# Patient Record
Sex: FEMALE | Race: BLACK OR AFRICAN AMERICAN | Employment: UNEMPLOYED | URBAN - METROPOLITAN AREA
[De-identification: names, ages, dates, MRNs, and addresses within clinical notes are randomized per-mention and may not be internally consistent; named-entity substitution may affect disease eponyms.]

---

## 2019-03-07 RX ORDER — CALCITRIOL 0.5 UG/1
0.5 CAPSULE ORAL DAILY
COMMUNITY

## 2019-03-07 RX ORDER — CINACALCET 30 MG/1
30 TABLET, FILM COATED ORAL DAILY
COMMUNITY

## 2019-03-07 RX ORDER — SEVELAMER CARBONATE 800 MG/1
800 TABLET, FILM COATED ORAL
COMMUNITY

## 2019-03-07 RX ORDER — ATORVASTATIN CALCIUM 40 MG/1
40 TABLET, FILM COATED ORAL DAILY
COMMUNITY

## 2019-03-07 NOTE — PROGRESS NOTES
PT aware of NPO status. PT aware of need to hold anticoagulants per protocol. PT aware of potential for sedation administration and need for  at discharge. PT aware of arrival time pre procedure. Will arrive at 1300 per Dr Ernst Moreno office. Will call back to review medications as patient is not at home. Pt states no questions at this time.

## 2019-03-08 ENCOUNTER — HOSPITAL ENCOUNTER (OUTPATIENT)
Dept: INTERVENTIONAL RADIOLOGY/VASCULAR | Age: 49
Discharge: HOME | End: 2019-03-08
Attending: SURGERY | Admitting: SURGERY
Payer: MEDICARE

## 2019-03-08 VITALS
SYSTOLIC BLOOD PRESSURE: 114 MMHG | TEMPERATURE: 97.6 F | HEIGHT: 69 IN | DIASTOLIC BLOOD PRESSURE: 61 MMHG | OXYGEN SATURATION: 96 % | BODY MASS INDEX: 38.71 KG/M2 | WEIGHT: 261.38 LBS | RESPIRATION RATE: 13 BRPM | HEART RATE: 80 BPM

## 2019-03-08 DIAGNOSIS — N18.6 ESRD (END STAGE RENAL DISEASE) (HCC): ICD-10-CM

## 2019-03-08 LAB
ANION GAP SERPL CALC-SCNC: 10 MMOL/L (ref 3–18)
APTT PPP: 33.7 SEC (ref 23–36.4)
BASOPHILS # BLD: 0 K/UL (ref 0–0.1)
BASOPHILS NFR BLD: 0 % (ref 0–2)
BUN SERPL-MCNC: 78 MG/DL (ref 7–18)
BUN/CREAT SERPL: 7 (ref 12–20)
CALCIUM SERPL-MCNC: 8.8 MG/DL (ref 8.5–10.1)
CHLORIDE SERPL-SCNC: 102 MMOL/L (ref 100–108)
CO2 SERPL-SCNC: 28 MMOL/L (ref 21–32)
CREAT SERPL-MCNC: 11.9 MG/DL (ref 0.6–1.3)
DIFFERENTIAL METHOD BLD: ABNORMAL
EOSINOPHIL # BLD: 0.2 K/UL (ref 0–0.4)
EOSINOPHIL NFR BLD: 3 % (ref 0–5)
ERYTHROCYTE [DISTWIDTH] IN BLOOD BY AUTOMATED COUNT: 16.6 % (ref 11.6–14.5)
GLUCOSE SERPL-MCNC: 85 MG/DL (ref 74–99)
HCG SERPL QL: NEGATIVE
HCT VFR BLD AUTO: 31 % (ref 35–45)
HGB BLD-MCNC: 9.2 G/DL (ref 12–16)
INR PPP: 1 (ref 0.8–1.2)
LYMPHOCYTES # BLD: 1.7 K/UL (ref 0.9–3.6)
LYMPHOCYTES NFR BLD: 26 % (ref 21–52)
MCH RBC QN AUTO: 28.5 PG (ref 24–34)
MCHC RBC AUTO-ENTMCNC: 29.7 G/DL (ref 31–37)
MCV RBC AUTO: 96 FL (ref 74–97)
MONOCYTES # BLD: 0.5 K/UL (ref 0.05–1.2)
MONOCYTES NFR BLD: 7 % (ref 3–10)
NEUTS SEG # BLD: 4.1 K/UL (ref 1.8–8)
NEUTS SEG NFR BLD: 64 % (ref 40–73)
PLATELET # BLD AUTO: 163 K/UL (ref 135–420)
PMV BLD AUTO: 9.4 FL (ref 9.2–11.8)
POTASSIUM SERPL-SCNC: 4.4 MMOL/L (ref 3.5–5.5)
PROTHROMBIN TIME: 13.2 SEC (ref 11.5–15.2)
RBC # BLD AUTO: 3.23 M/UL (ref 4.2–5.3)
SODIUM SERPL-SCNC: 140 MMOL/L (ref 136–145)
WBC # BLD AUTO: 6.5 K/UL (ref 4.6–13.2)

## 2019-03-08 PROCEDURE — 74011250636 HC RX REV CODE- 250/636

## 2019-03-08 PROCEDURE — 36902 INTRO CATH DIALYSIS CIRCUIT: CPT

## 2019-03-08 PROCEDURE — 85610 PROTHROMBIN TIME: CPT

## 2019-03-08 PROCEDURE — 85730 THROMBOPLASTIN TIME PARTIAL: CPT

## 2019-03-08 PROCEDURE — C1725 CATH, TRANSLUMIN NON-LASER: HCPCS

## 2019-03-08 PROCEDURE — 36901 INTRO CATH DIALYSIS CIRCUIT: CPT

## 2019-03-08 PROCEDURE — 85025 COMPLETE CBC W/AUTO DIFF WBC: CPT

## 2019-03-08 PROCEDURE — 74011636320 HC RX REV CODE- 636/320: Performed by: SURGERY

## 2019-03-08 PROCEDURE — 80048 BASIC METABOLIC PNL TOTAL CA: CPT

## 2019-03-08 PROCEDURE — 74011250636 HC RX REV CODE- 250/636: Performed by: SURGERY

## 2019-03-08 PROCEDURE — 84703 CHORIONIC GONADOTROPIN ASSAY: CPT

## 2019-03-08 PROCEDURE — 99152 MOD SED SAME PHYS/QHP 5/>YRS: CPT

## 2019-03-08 RX ORDER — FLUMAZENIL 0.1 MG/ML
INJECTION INTRAVENOUS
Status: DISCONTINUED
Start: 2019-03-08 | End: 2019-03-08 | Stop reason: WASHOUT

## 2019-03-08 RX ORDER — HEPARIN SODIUM 200 [USP'U]/100ML
INJECTION, SOLUTION INTRAVENOUS
Status: DISCONTINUED
Start: 2019-03-08 | End: 2019-03-08 | Stop reason: HOSPADM

## 2019-03-08 RX ORDER — SODIUM CHLORIDE 9 MG/ML
25 INJECTION, SOLUTION INTRAVENOUS CONTINUOUS
Status: DISCONTINUED | OUTPATIENT
Start: 2019-03-08 | End: 2019-03-08 | Stop reason: HOSPADM

## 2019-03-08 RX ORDER — LIDOCAINE HYDROCHLORIDE 10 MG/ML
INJECTION, SOLUTION EPIDURAL; INFILTRATION; INTRACAUDAL; PERINEURAL
Status: COMPLETED
Start: 2019-03-08 | End: 2019-03-08

## 2019-03-08 RX ORDER — FLUMAZENIL 0.1 MG/ML
0.2 INJECTION INTRAVENOUS
Status: DISCONTINUED | OUTPATIENT
Start: 2019-03-08 | End: 2019-03-08 | Stop reason: HOSPADM

## 2019-03-08 RX ORDER — LIDOCAINE HYDROCHLORIDE 10 MG/ML
1-10 INJECTION, SOLUTION EPIDURAL; INFILTRATION; INTRACAUDAL; PERINEURAL
Status: COMPLETED | OUTPATIENT
Start: 2019-03-08 | End: 2019-03-08

## 2019-03-08 RX ORDER — NALOXONE HYDROCHLORIDE 0.4 MG/ML
0.1 INJECTION, SOLUTION INTRAMUSCULAR; INTRAVENOUS; SUBCUTANEOUS AS NEEDED
Status: DISCONTINUED | OUTPATIENT
Start: 2019-03-08 | End: 2019-03-08 | Stop reason: HOSPADM

## 2019-03-08 RX ORDER — CLINDAMYCIN PHOSPHATE 900 MG/50ML
900 INJECTION, SOLUTION INTRAVENOUS ONCE
Status: COMPLETED | OUTPATIENT
Start: 2019-03-08 | End: 2019-03-08

## 2019-03-08 RX ORDER — MIDAZOLAM HYDROCHLORIDE 1 MG/ML
.5-4 INJECTION, SOLUTION INTRAMUSCULAR; INTRAVENOUS
Status: DISCONTINUED | OUTPATIENT
Start: 2019-03-08 | End: 2019-03-08 | Stop reason: HOSPADM

## 2019-03-08 RX ORDER — FENTANYL CITRATE 50 UG/ML
INJECTION, SOLUTION INTRAMUSCULAR; INTRAVENOUS
Status: COMPLETED
Start: 2019-03-08 | End: 2019-03-08

## 2019-03-08 RX ORDER — HEPARIN SODIUM 1000 [USP'U]/ML
INJECTION, SOLUTION INTRAVENOUS; SUBCUTANEOUS
Status: DISCONTINUED
Start: 2019-03-08 | End: 2019-03-08 | Stop reason: WASHOUT

## 2019-03-08 RX ORDER — HEPARIN SODIUM 200 [USP'U]/100ML
500 INJECTION, SOLUTION INTRAVENOUS
Status: DISCONTINUED | OUTPATIENT
Start: 2019-03-08 | End: 2019-03-08 | Stop reason: HOSPADM

## 2019-03-08 RX ORDER — MIDAZOLAM HYDROCHLORIDE 1 MG/ML
INJECTION, SOLUTION INTRAMUSCULAR; INTRAVENOUS
Status: COMPLETED
Start: 2019-03-08 | End: 2019-03-08

## 2019-03-08 RX ORDER — HEPARIN SODIUM 1000 [USP'U]/ML
10000 INJECTION, SOLUTION INTRAVENOUS; SUBCUTANEOUS
Status: DISCONTINUED | OUTPATIENT
Start: 2019-03-08 | End: 2019-03-08 | Stop reason: HOSPADM

## 2019-03-08 RX ORDER — FENTANYL CITRATE 50 UG/ML
25-200 INJECTION, SOLUTION INTRAMUSCULAR; INTRAVENOUS
Status: DISCONTINUED | OUTPATIENT
Start: 2019-03-08 | End: 2019-03-08 | Stop reason: HOSPADM

## 2019-03-08 RX ORDER — NALOXONE HYDROCHLORIDE 0.4 MG/ML
INJECTION, SOLUTION INTRAMUSCULAR; INTRAVENOUS; SUBCUTANEOUS
Status: DISCONTINUED
Start: 2019-03-08 | End: 2019-03-08 | Stop reason: WASHOUT

## 2019-03-08 RX ADMIN — MIDAZOLAM HYDROCHLORIDE 1 MG: 1 INJECTION, SOLUTION INTRAMUSCULAR; INTRAVENOUS at 17:14

## 2019-03-08 RX ADMIN — LIDOCAINE HYDROCHLORIDE 1 ML: 10 INJECTION, SOLUTION EPIDURAL; INFILTRATION; INTRACAUDAL; PERINEURAL at 17:24

## 2019-03-08 RX ADMIN — IOPAMIDOL 30 ML: 612 INJECTION, SOLUTION INTRAVENOUS at 17:24

## 2019-03-08 RX ADMIN — CLINDAMYCIN PHOSPHATE 900 MG: 900 INJECTION, SOLUTION INTRAVENOUS at 17:09

## 2019-03-08 RX ADMIN — SODIUM CHLORIDE 25 ML/HR: 900 INJECTION, SOLUTION INTRAVENOUS at 13:54

## 2019-03-08 RX ADMIN — FENTANYL CITRATE 50 MCG: 50 INJECTION, SOLUTION INTRAMUSCULAR; INTRAVENOUS at 17:14

## 2019-03-08 RX ADMIN — FENTANYL CITRATE 50 MCG: 50 INJECTION, SOLUTION INTRAMUSCULAR; INTRAVENOUS at 17:16

## 2019-03-08 NOTE — PROCEDURES
[facility] SPECIAL[facility]  PROCEDURE NOTE  [facility]        Date: 3/8/2019, 5:40 PM      Pre-Op Dx : Inability to cannulate right arm AVG with pulling of clots    Post-Op Dx: same    Procedure : Right arm Fistulagram with retrograde brachial arteriogram, central venogram and balloon angioplasty of AVG    Surgeon : Xenia Young    Assistant: None    Anesthesia : Moderate Sedation and lidocaine    Findings : Completely normal Fistualgram . Mild stenosis at junction between new AVG and native vein from previous Fistula. Skin marked for dialysis center    Comp: none    EBL : Minimal    Contrast : 40 ml. Visipaque    Access : Right AVG    Implants : NO     Specimens: None    Blood Product Administered : NO     Sheath Size : 6 Romanian    Heparin : NO 0 thousand units    Closure Device : NO NA    US Guidance : NO     Special Devices : none    Sedation :    Moderate sedation was administered. The patient was constantly monitored by Dials from   until 1725  hours. Fentanyl 100 mcg, Versed 1 mg. INDICATION FOR PROCEDURE:    60-year-old -American female who recently had a revision of her right upper extremity AV access with an interposition AV graft. She also had an infected portion of AV graft that was excised. She comes in today because she says that they are pulling clots at dialysis. The graft has been successfully angioplastied. On February 26, 2019 I performed a completely normal fistulogram on her. I told her I would do another fistulogram to ascertain whether or not there were any abnormalities. Risks, benefits and potential complications of the procedure were explained to the patient prior to proceeding and she signed a consent allowing me to proceed    TECHNICAL DETAILS OF PROCEDURE:    The patient was correctly identified brought to the interventional suite and placed in the supine position. Her right arm was prepped and draped in a sterile fashion.   A timeout was taken. Moderate sedation was administered. 6 [place]dean sheath was inserted into the AV graft using Seldinger technique after numbing the skin with 1% lidocaine. Fistulogram was performed. Central venogram was performed. No abnormalities were encountered. I passed a wire centrally. There was a very mild area of stenosis between the AV graft and native vein anastomosis. I inflated an 8 mm balloon here and did a retrograde brachial arteriogram.  There was no inflow problems. I deflated the balloon and removed it. Completion fistulogram showed no residual stenosis whatsoever. Brisk was flow. I removed the wire and sheath and closed the puncture site with a 4-0 Monocryl pursestring suture. Hemostasis was good. Dermabond and a sterile dressing were applied. The curtains were taken down and the skin was marked for the dialysis access center to know where to access. I was present and scrubbed throughout.         [address] [place] [address] Vascular Specialists  [phone]  Pager [phone]

## 2019-03-08 NOTE — PROGRESS NOTES
Back from procedure. Right upper arm fistula dressing intact. pt denies pain. 1745 Diet given. [address] Discharge instruction given, verbalized understanding. Dressing intact to fistula. Pt denies pain.

## 2019-03-08 NOTE — H&P
Patient History and Evaluation      3-8-2019 1641 hours    Lina Sher is a 53 y.o. female              PCP:None No PCP PCP, MD  HPI: S/P recent revision of the right arm AVG  Apparently have been pulling clots at Hemodialysis  Here for Fistulagram possible intervention. Can not seem to be able to access it according to her  I can feel a thrill but it is also somewhat pulsatile       Allergies   Allergen Reactions    Flagyl [Metronidazole] hives    Percocet [Oxycodone-Acetaminophen] anaphylaxis/angioedema    Penicillins hives      Current Medications:         Outpatient Medications Marked as Taking for the 2/26/19 encounter [facility] Encounter) with Emeli Anna, DO   Medication Sig Dispense Refill    atorvastatin (LIPITOR) 40 mg PO TABS Take 40 mg by Mouth Every Night at Bedtime.        b complex-vitamin c-folic acid (NEPHRO/TRIPHROCAPS) 1 mg PO CAPS Take 1 Cap by Mouth Once a Day.        calcitRIOL (ROCALTROL) 0.25 mcg PO CAPS Take 0.25 mcg by Mouth Once a Day. Take 1 by mouth daily Monday-Friday; 2 by mouth on Saturday and Sunday. (9 total per week)        Cholecalciferol, Vitamin D3, 50,000 unit PO CAPS Take  by Mouth Every 7 Days.        cinacalcet (SENSIPAR) 30 mg PO TABS Take 60 mg by Mouth Once a Day. Take with largest meal        cyclobenzaprine (FLEXERIL) 10 mg PO TABS Take 10 mg by Mouth Every 8 Hours As Needed.        epoetin jaskaran, ESRD, (EPOGEN) 10,000 unit/mL Inj SOLN Inject 10,000 Units beneath the skin Every Monday, Wednesday & Friday.        gabapentin (NEURONTIN) 300 mg PO CAPS Take 300 mg by Mouth 2 Times Daily As Needed.        HYDROmorphone (HYDROMORPHONE) 2 mg PO TABS Take 1 Tab by Mouth Every 4 Hours for 5 days. 18 Tab 0    linaclotide (LINZESS) 145 mcg PO CAPS Take 145 mcg by Mouth Once a Day.        MINERAL OIL PO Take 30 mL by Mouth Once a Day.        sevelamer carbonate (RENVELA) 800 mg PO TABS Take 1,600 mg by Mouth Twice Daily.  2 tablets 2 times a day with meals, 1 tablet with snacks        topiramate (TOPAMAX) 100 mg PO TABS Take 100 mg by Mouth Once a Day.          Home medications were reviewed with patient: Yes  History:  Past Medical History:   Diagnosis Date    Amenorrhea      Anemia in chronic renal disease      Constipation      End stage renal disease on dialysis ([address] 2015     Home HD M-F  [address] at [facility] ([phone])    FSGS (focal segmental glomerulosclerosis)      Hyperlipidemia      Iron deficiency anemia      Migraine with aura and without status migrainosus, not intractable      Pre-transplant evaluation for end stage renal disease 2018    Secondary hyperparathyroidism of renal origin [address] [address]      Vitamin D deficiency              Past Surgical History:   Procedure Laterality Date    ARTERIOVENOUS FISTULA REVISION   2018     pt has total of 2 stents in Fistula    ARTERIOVENOUS FISTULA REVISION Right 2019     Procedure: REVISION, AV FISTULA, UPPER EXTREMITY, WITH THROMBECTOMY,INTERPOSITION GRAFT,EXCISE INFECTED AV GRAFT AND STENT;  Surgeon: Gladys Noriega MD     SECTION        DIALYSIS FISTULA OR GRAFT Right      Insertion of Right A-V Fistula    ENDOMETRIAL ABLATION        OTHER        Peritoneal Dialysis Catheter removed      History reviewed. No pertinent family history.   Social History               Socioeconomic History    Marital status: Single       Spouse name: Not on file    Number of children: Not on file    Years of education: Not on file    Highest education level: Not on file   Occupational History    Not on file   Social Needs    Financial resource strain: Not on file    Food insecurity:       Worry: Not on file       Inability: Not on file    Transportation needs:       Medical: Not on file       Non-medical: Not on file   Tobacco Use    Smoking status: Former Smoker       Packs/day: 0.10       Years: 10.00       Pack years: 1.00       Last attempt to quit: 2013       Years since quittin.1    Smokeless tobacco: Never Used   Substance and Sexual Activity    Alcohol use:  Yes       Comment: Occassionally    Drug use: No    Sexual activity: Not on file   Lifestyle    Physical activity:       Days per week: Not on file       Minutes per session: Not on file    Stress: Not on file   Relationships    Social connections:       Talks on phone: Not on file       Gets together: Not on file       Attends [employer] service: Not on file       Active member of club or organization: Not on file       Attends meetings of clubs or organizations: Not on file       Relationship status: Not on file    Intimate partner violence:       Fear of current or ex partner: Not on file       Emotionally abused: Not on file       Physically abused: Not on file       Forced sexual activity: Not on file   Other Topics Concern    Not on file   Social History Narrative    Not on file         Physical Exam:  /66   Pulse 87   Temp 97.7 °F (36.5 °C)   Resp 17   Ht 5' 9\" (1.753 m)   Wt 120.7 kg (266 lb 2 oz)   LMP 2002   SpO2 100%   BMI 39.30 kg/m²   APTT/INR   No results found for: PT, INR      No results found for: APTT     CBC         Lab Results   Component Value Date/Time     WBC 5.2 2019 03:55 AM     RBC 2.75 (L) 2019 03:55 AM     HEMOGLOBIN 9.2 (L) 2019 12:07 PM     HEMOGLOBIN 8.2 (L) 2019 03:55 AM     HCT 27.0 (L) 2019 12:07 PM     HCT 26.5 (L) 2019 03:55 AM     MCV 96 (H) 2019 03:55 AM     MCH 30 2019 03:55 AM     MCHC 31 2019 03:55 AM     RDW 16.3 (H) 2019 03:55 AM     PLATELET 809 45/10/6839 03:55 AM     MPV 8.7 (L) 2019 03:55 AM          HEENT: Normal  Heart:  Regular rate and rhythm  Lungs: Clear to auscultation bilaterally  Abdomen: Soft, nontender, normal bowel sounds     RIGHT ARM AVG patent with thrill but pulsatile  Incisions healing well  Tender to touch but no evidence of cellulitus     Impression:  Patient is an appropriate candidate for procedure. Malfunction right arm AVG  Plan:  1. Proceed to scheduled procedure. Fistulagram RIGHT ARM AV GRAFT  2.  Informed consent was obtained.      Manish Alcala DO FACS

## 2019-03-08 NOTE — DISCHARGE INSTRUCTIONS
Patient Education        Hemodialysis Access: What to Expect at [address] [facility]  Hemodialysis is a way to remove wastes from the blood when your kidneys can no longer do the job. It is not a cure, but it can help you live longer and feel better. It is a lifesaving treatment when you have kidney failure. Hemodialysis is often called dialysis. Your doctor created a place (called an access) in your arm for your blood to flow in and out of your body during your dialysis sessions. Your arm will probably be bruised and swollen. It may hurt. The cut (incision) may bleed. The pain and bleeding will get better over several days. You will probably need only over-the-counter pain medicine. You can reduce swelling by propping your arm on 1 or 2 pillows and keeping your elbow straight. You will have stitches. These may dissolve on their own, or your doctor will tell you when to come in to have them removed. You should also be able to return to work in a few days. You may feel some coolness or numbness in your hand. These feelings usually go away in a few weeks. Your doctor may suggest squeezing a soft object. This will strengthen your access and may make hemodialysis faster and easier. You should always be able to feel blood rushing through the fistula or graft. It feels like a slight vibration when you put your fingers on the skin over the fistula or graft. This feeling is called a thrill or pulse. This care sheet gives you a general idea about how long it will take for you to recover. But each person recovers at a different pace. Follow the steps below to get better as quickly as possible. How can you care for yourself at home? Activity    · Rest when you feel tired. Getting enough sleep will help you recover.  Do not lie on or sleep on the arm with the access.     · Avoid activities such as washing windows or gardening that put stress on the arm with the access.     · You may use your arm, but do not lift anything that weighs more than about 15 pounds. This may include a child, heavy grocery bags, a heavy briefcase or backpack, cat litter or dog food bags, or a vacuum .     · You can shower, but keep the access dry for the first 2 days. Cover the area with a plastic bag to keep it dry.     · Do not soak or scrub the incision until it has healed.     · Wear an arm guard to protect the area if you play sports or work with your arms.     · You may drive when your doctor says it is okay. This is usually in 1 to 2 days.     · Most people are able to return to work about 1 or 2 days after surgery. Diet    · Follow an eating plan that is good for your kidneys. A registered dietitian can help you make a meal plan that is right for you. You may need to limit protein, salt, fluids, and certain foods. Medicines    · Your doctor will tell you if and when you can restart your medicines. He or she will also give you instructions about taking any new medicines.     · If you take blood thinners, such as warfarin (Coumadin), clopidogrel (Plavix), or aspirin, be sure to talk to your doctor. He or she will tell you if and when to start taking those medicines again. Make sure that you understand exactly what your doctor wants you to do.     · Take pain medicines exactly as directed. ? If the doctor gave you a prescription medicine for pain, take it as prescribed. ? If you are not taking a prescription pain medicine, ask your doctor if you can take acetaminophen (Tylenol). Do not take ibuprofen (Advil, Motrin) or naproxen (Aleve), or similar medicines, unless your doctor tells you to. They may make chronic kidney disease worse. ? Do not take two or more pain medicines at the same time unless the doctor told you to. Many pain medicines have acetaminophen, which is Tylenol.  Too much acetaminophen (Tylenol) can be harmful.     · If you think your pain medicine is making you sick to your stomach:  ? Take your medicine after meals (unless your doctor has told you not to). ? Ask your doctor for a different pain medicine.     · If your doctor prescribed antibiotics, take them as directed. Do not stop taking them just because you feel better. You need to take the full course of antibiotics. Incision care    · Keep the area dry for 2 days. After 2 days, wash the area with soap and water every day, and always before dialysis.     · Do not soak or scrub the incision until it has healed.     · If you have a bandage, change it every day or as your doctor recommends. Your doctor will tell you when you can remove it. Exercise    · Squeeze a soft ball or other object as your doctor tells you. This will help blood flow through the access and help prevent blood clots.    Elevation    · Prop up the sore arm on a pillow anytime you sit or lie down during the next 3 days. Try to keep it above the level of your heart. This will help reduce swelling. Other instructions    · Every day, check your access for a pulse or thrill in the fistula or graft area. A thrill is a vibration. To feel a pulse or thrill, place the first two fingers of your hand over the access.     · Do not bump your arm.     · Do not wear tight clothing, jewelry, or anything else that may squeeze the access.     · Use your other arm to have blood drawn or blood pressure taken.     · Do not put cream or lotion on or near the access.     · Make sure all doctors you deal with know you have a vascular access. Follow-up care is a key part of your treatment and safety. Be sure to make and go to all appointments, and call your doctor if you are having problems. It's also a good idea to know your test results and keep a list of the medicines you take. When should you call for help? Call 911 anytime you think you may need emergency care.  For example, call if:    · You passed out (lost consciousness).     · You have chest pain, are short of breath, or cough up blood.    Call your doctor now or seek immediate medical care if:    · Your hand or arm is cold or dark-colored.     · You have no pulse in your access.     · You have nausea or you vomit.     · You have pain that does not get better after you take pain medicine.     · You have loose stitches, or your incision comes open.     · You are bleeding from the incision.     · You have signs of infection, such as:  ? Increased pain, swelling, warmth, or redness. ? Red streaks leading from the area. ? Pus draining from the area. ? A fever.     · You have signs of a blood clot in your leg (called a deep vein thrombosis), such as:  ? Pain in your calf, back of the knee, thigh, or groin. ? Redness or swelling in your leg.    Watch closely for changes in your health, and be sure to contact your doctor if you have any problems. Where can you learn more? Go to http://phong-montrell.info/. Enter P616 in the search box to learn more about \"Hemodialysis Access: What to Expect at Home. \"  Current as of: March 14, 2018  Content Version: 11.9  © 2686-2368 [employer]. Care instructions adapted under license by [employer] (which disclaims liability or warranty for this information). If you have questions about a medical condition or this instruction, always ask your healthcare professional. [address] any warranty or liability for your use of this information. DISCHARGE SUMMARY from Nurse    PATIENT INSTRUCTIONS:    After general anesthesia or intravenous sedation, for 24 hours or while taking prescription Narcotics:  · Limit your activities  · Do not drive and operate hazardous machinery  · Do not make important personal or business decisions  · Do  not drink alcoholic beverages  · If you have not urinated within 8 hours after discharge, please contact your surgeon on call.     Report the following to your surgeon:  · Excessive pain, swelling, redness or odor of or around the surgical area  · Temperature over 100.5  · Nausea and vomiting lasting longer than 4 hours or if unable to take medications  · Any signs of decreased circulation or nerve impairment to extremity: change in color, persistent  numbness, tingling, coldness or increase pain  · Any questions    What to do at Home:  Recommended activity: Activity as tolerated,       *  Please give a list of your current medications to your Primary Care Provider. *  Please update this list whenever your medications are discontinued, doses are      changed, or new medications (including over-the-counter products) are added. *  Please carry medication information at all times in case of emergency situations. These are general instructions for a healthy lifestyle:    No smoking/ No tobacco products/ Avoid exposure to second hand smoke  Surgeon General's Warning:  Quitting smoking now greatly reduces serious risk to your health. Obesity, smoking, and sedentary lifestyle greatly increases your risk for illness    A healthy diet, regular physical exercise & weight monitoring are important for maintaining a healthy lifestyle    You may be retaining fluid if you have a history of heart failure or if you experience any of the following symptoms:  Weight gain of 3 pounds or more overnight or 5 pounds in a week, increased swelling in our hands or feet or shortness of breath while lying flat in bed. Please call your doctor as soon as you notice any of these symptoms; do not wait until your next office visit. Recognize signs and symptoms of STROKE:    F-face looks uneven    A-arms unable to move or move unevenly    S-speech slurred or non-existent    T-time-call 911 as soon as signs and symptoms begin-DO NOT go       Back to bed or wait to see if you get better-TIME IS BRAIN. Warning Signs of HEART ATTACK     Call 911 if you have these symptoms:   Chest discomfort.  Most heart attacks involve discomfort in the center of the chest that lasts more than a few minutes, or that goes away and comes back. It can feel like uncomfortable pressure, squeezing, fullness, or pain.  Discomfort in other areas of the upper body. Symptoms can include pain or discomfort in one or both arms, the back, neck, jaw, or stomach.  Shortness of breath with or without chest discomfort.  Other signs may include breaking out in a cold sweat, nausea, or lightheadedness. Don't wait more than five minutes to call 911 - MINUTES MATTER! Fast action can save your life. Calling 911 is almost always the fastest way to get lifesaving treatment. Emergency Medical Services staff can begin treatment when they arrive -- up to an hour sooner than if someone gets to the hospital by car. The discharge information has been reviewed with the patient and caregiver. The patient and caregiver verbalized understanding. Discharge medications reviewed with the patient and caregiver and appropriate educational materials and side effects teaching were provided.     Patient armband removed and shredded    ___________________________________________________________________________________________________________________________________

## 2019-03-08 NOTE — PROGRESS NOTES
TRANSFER - OUT REPORT:    Verbal report given to tomas willoughby RN(name) on Mati Dear  being transferred to care unit(unit) for routine progression of care       Report consisted of patients Situation, Background, Assessment and   Recommendations(SBAR). Information from the following report(s) Kardex, Procedure Summary and MAR was reviewed with the receiving nurse. Lines:   Peripheral IV 03/08/19 Anterior; Left Hand (Active)   Site Assessment Clean, dry, & intact 3/8/2019  1:53 PM   Phlebitis Assessment 0 3/8/2019  1:53 PM   Infiltration Assessment 0 3/8/2019  1:53 PM   Dressing Status Clean, dry, & intact 3/8/2019  1:53 PM   Dressing Type Tape;Transparent 3/8/2019  1:53 PM   Hub Color/Line Status Flushed;Blue; Infusing;Capped 3/8/2019  1:53 PM   Action Taken Open ports on tubing capped 3/8/2019  1:53 PM   Alcohol Cap Used Yes 3/8/2019  1:53 PM        Opportunity for questions and clarification was provided.       Patient transported with:   Registered Nurse

## 2020-02-18 ENCOUNTER — HOSPITAL ENCOUNTER (OUTPATIENT)
Age: 50
Setting detail: OUTPATIENT SURGERY
Discharge: HOME | End: 2020-02-18
Attending: SURGERY | Admitting: SURGERY
Payer: MEDICARE

## 2020-02-18 VITALS
HEIGHT: 69 IN | OXYGEN SATURATION: 100 % | TEMPERATURE: 97.4 F | HEART RATE: 90 BPM | DIASTOLIC BLOOD PRESSURE: 45 MMHG | SYSTOLIC BLOOD PRESSURE: 102 MMHG | BODY MASS INDEX: 38.27 KG/M2 | WEIGHT: 258.38 LBS | RESPIRATION RATE: 16 BRPM

## 2020-02-18 LAB
HCG UR QL: NEGATIVE
POTASSIUM SERPL-SCNC: 4.3 MMOL/L (ref 3.5–5.5)

## 2020-02-18 PROCEDURE — 74011250636 HC RX REV CODE- 250/636: Performed by: SURGERY

## 2020-02-18 PROCEDURE — 84132 ASSAY OF SERUM POTASSIUM: CPT

## 2020-02-18 PROCEDURE — 81025 URINE PREGNANCY TEST: CPT

## 2020-02-18 RX ORDER — SODIUM CHLORIDE 9 MG/ML
50 INJECTION, SOLUTION INTRAVENOUS CONTINUOUS
Status: DISCONTINUED | OUTPATIENT
Start: 2020-02-18 | End: 2020-02-19 | Stop reason: HOSPADM

## 2020-02-18 RX ORDER — CEFAZOLIN SODIUM 2 G/50ML
2 SOLUTION INTRAVENOUS ONCE
Status: DISCONTINUED | OUTPATIENT
Start: 2020-02-18 | End: 2020-02-19 | Stop reason: HOSPADM

## 2020-02-18 RX ADMIN — SODIUM CHLORIDE 50 ML/HR: 900 INJECTION, SOLUTION INTRAVENOUS at 13:32

## 2020-02-18 NOTE — NURSE NAVIGATOR
Case cancelled for today. Surgeon  States current case will take much longer than anticipated. She is willing to reschedule for another day. Will call Dr. Silvia Vazquez office in A.M. Requested ginger ale to drink and preparing for discharge home.

## 2020-02-18 NOTE — H&P
Assessment/Plan: Radhika Coreas is an 51 y.o. female thrombosed RUE AV graft     Plan: For AVG percutaneous thrombectomy today        HPI: Jo Ann Tolentino is an 51 y.o. female with thrombosed RUE AV fistula/graft (hybrid)     RUE AV fistula converted to AVG 19.  Accuseal. Most recent fistulogram 19 There was a stenosis between the graft and native vein anastomosis.  The remainder of the outflow including the central veins was widely patent.     Last dialysis was Thursday. She noted that the AV graft was thrombosed, she sticks herself and dialyzes at home everyday.                 Past Medical History:   Diagnosis Date    Amenorrhea       no menses for 18 years    Anemia in chronic renal disease      Anemia in chronic renal disease      Constipation      End stage renal disease ([address]      End stage renal disease on dialysis ([address] 2015     Home HD GRETCHEN-F [facility] ([phone])    FSGS (focal segmental glomerulosclerosis)      Hypercholesteremia      Hyperlipidemia      Hyperphosphatemia      Iron deficiency anemia      Migraine with aura and without status migrainosus, not intractable      Pre-transplant evaluation for end stage renal disease 2019    Secondary hyperparathyroidism of renal origin ([address]      Vitamin D deficiency                        Past Surgical History:   Procedure Laterality Date    ARTERIOVENOUS FISTULA REVISION   2018     pt has total of 2 stents in Fistula    ARTERIOVENOUS FISTULA REVISION Right 2019     Procedure: REVISION, AV FISTULA, UPPER EXTREMITY, WITH THROMBECTOMY,INTERPOSITION GRAFT,EXCISE INFECTED AV GRAFT AND STENT;  Surgeon: Deb Rock MD     SECTION        DIALYSIS FISTULA OR GRAFT Right      Insertion of Right A-V Fistula    DIALYSIS FISTULA OR GRAFT Right 2019     Procedure: fistulagram ;  Surgeon: Fredrick, [facility], DO    ENDOMETRIAL ABLATION        OTHER        Peritoneal Dialysis Catheter removed                   Allergies   Allergen Reactions    Flagyl [Metronidazole] hives    Gentamicin swelling       Blisters,rash    Percocet [Oxycodone-Acetaminophen] anaphylaxis/angioedema    Penicillins hives                   Family History   Problem Relation Age of Onset    Hypertension Mother           Social History                  Socioeconomic History    Marital status: Single       Spouse name: Not on file    Number of children: Not on file    Years of education: Not on file    Highest education level: Not on file   Occupational History    Not on file   Social Needs    Financial resource strain: Not on file    Food insecurity:       Worry: Not on file       Inability: Not on file    Transportation needs:       Medical: Not on file       Non-medical: Not on file   Tobacco Use    Smoking status: Former Smoker       Packs/day: 0.10       Years: 10.00       Pack years: 1.00       Last attempt to quit: 2013       Years since quittin.1    Smokeless tobacco: Never Used   Substance and Sexual Activity    Alcohol use: Yes       Alcohol/week: 0.8 standard drinks       Types: 1 Glasses of wine per week       Frequency: Monthly or less       Comment: Occassionally    Drug use: No    Sexual activity: Not on file   Lifestyle    Physical activity:       Days per week: Not on file       Minutes per session: Not on file    Stress: Not on file   Relationships    Social connections:       Talks on phone: Not on file       Gets together: Not on file       Attends Orthodox service: Not on file       Active member of club or organization: Not on file       Attends meetings of clubs or organizations: Not on file       Relationship status: Not on file    Intimate partner violence:       Fear of current or ex partner: Not on file       Emotionally abused: Not on file       Physically abused: Not on file       Forced sexual activity: Not on file   Other Topics Concern    Do you use a bike helmet? Not Asked    Do you use caffeine daily? No    Do you exercise? Yes    Are there hazards related to your hobbies? No     Service No    Do you have any work place hazards? No    Do you wear a seat belt while in a moving vehicle? Yes    Do you have sleep concerns? No    Smoke Detectors Yes    Do you follow a special diet? Yes    Are you concerned with your weight? Yes   Social History Narrative    Not on file              Review of Systems  Positive Findings will be BOLDED, otherwise negative  Constitutional:  Chills, diaphoresis, fever, malaise/fatigue, weakness, weight loss   Eyes:  Vision changes  ENT/Mouth/Face:  Congestion, headaches, sore throat   Respiratory:  Cough, shortness of breath   Cardiovascular:  Chest pain, claudication, leg swelling, palpitations   Gastrointestinal:  Abdominal pain, blood in stool, nausea, vomiting   Genitourinary:  Dysuria, flank pain, frequency, blood in urine  Integumentary:  Rashes  Hematologic: Easy bruising/bleeding  Musculoskeletal: Back pain, muscle pain  Neurological: Dizziness, focal weakness, seizures, sensory changes  Psych: Depression, memory loss, nervous/anxious, substance abuse     Objective:   Physical Exam  /61   Temp 98.1 °F (36.7 °C)   Resp 17   Ht 5' 9\" (1.753 m)   Wt 111.1 kg (245 lb)   SpO2 100%   BMI 36.18 kg/m²   General: Awake, alert, and oriented. HEENT: Normocephalic and atraumatic  Neck: Trachea midline. No JVD. Heart: Regular rate   Lungs: Symmetrical chest expansion. Normal effort  Abdomen: Soft, non-tender. Extremities: RUE AV graft, no thrill. + radial pulse   Neuro: Alert and oriented.    Skin: Warm and dry.         IMP thrombosis right UE lechuga avf/graft hybrid  PLAN percutaneous thrombectomy/possible TDC

## 2020-02-18 NOTE — PERIOP NOTES
Called Office to inquire on Hp and Consents . No one is aware of Dr Brigitte Murray process on how he does them , If he does them day of surgery or put them in [facility].

## 2021-06-21 ENCOUNTER — OFFICE VISIT (OUTPATIENT)
Dept: HEMATOLOGY | Age: 51
End: 2021-06-21
Payer: MEDICAID

## 2021-06-21 ENCOUNTER — HOSPITAL ENCOUNTER (OUTPATIENT)
Dept: LAB | Age: 51
Discharge: HOME | End: 2021-06-21
Payer: MEDICARE

## 2021-06-21 VITALS
HEART RATE: 91 BPM | TEMPERATURE: 99.1 F | DIASTOLIC BLOOD PRESSURE: 48 MMHG | BODY MASS INDEX: 39.03 KG/M2 | HEIGHT: 69 IN | SYSTOLIC BLOOD PRESSURE: 82 MMHG | WEIGHT: 263.5 LBS | OXYGEN SATURATION: 96 %

## 2021-06-21 DIAGNOSIS — R94.5 ABNORMAL RESULTS OF LIVER FUNCTION STUDIES: ICD-10-CM

## 2021-06-21 DIAGNOSIS — R74.8 ELEVATED ALKALINE PHOSPHATASE LEVEL: Primary | ICD-10-CM

## 2021-06-21 DIAGNOSIS — R74.8 ELEVATED ALKALINE PHOSPHATASE LEVEL: ICD-10-CM

## 2021-06-21 PROBLEM — G43.909 MIGRAINE HEADACHE: Status: ACTIVE | Noted: 2021-06-21

## 2021-06-21 PROBLEM — N18.6 ESRD ON DIALYSIS (HCC): Status: ACTIVE | Noted: 2021-06-21

## 2021-06-21 PROBLEM — E78.00 HYPERCHOLESTEROLEMIA: Status: ACTIVE | Noted: 2021-06-21

## 2021-06-21 PROBLEM — Z99.2 ESRD ON DIALYSIS (HCC): Status: ACTIVE | Noted: 2021-06-21

## 2021-06-21 LAB
ALBUMIN SERPL-MCNC: 4.3 G/DL (ref 3.4–5)
ALBUMIN/GLOB SERPL: 1.2 {RATIO} (ref 0.8–1.7)
ALP SERPL-CCNC: 281 U/L (ref 45–117)
ALT SERPL-CCNC: 22 U/L (ref 13–56)
ANION GAP SERPL CALC-SCNC: 9 MMOL/L (ref 3–18)
AST SERPL-CCNC: 8 U/L (ref 10–38)
BASOPHILS # BLD: 0 K/UL (ref 0–0.1)
BASOPHILS NFR BLD: 0 % (ref 0–2)
BILIRUB DIRECT SERPL-MCNC: 0.1 MG/DL (ref 0–0.2)
BILIRUB SERPL-MCNC: 0.3 MG/DL (ref 0.2–1)
BUN SERPL-MCNC: 57 MG/DL (ref 7–18)
BUN/CREAT SERPL: 4 (ref 12–20)
CALCIUM SERPL-MCNC: 9.9 MG/DL (ref 8.5–10.1)
CHLORIDE SERPL-SCNC: 97 MMOL/L (ref 100–111)
CO2 SERPL-SCNC: 31 MMOL/L (ref 21–32)
CREAT SERPL-MCNC: 13.4 MG/DL (ref 0.6–1.3)
DIFFERENTIAL METHOD BLD: ABNORMAL
EOSINOPHIL # BLD: 0.1 K/UL (ref 0–0.4)
EOSINOPHIL NFR BLD: 2 % (ref 0–5)
ERYTHROCYTE [DISTWIDTH] IN BLOOD BY AUTOMATED COUNT: 14.3 % (ref 11.6–14.5)
FERRITIN SERPL-MCNC: 529 NG/ML (ref 8–388)
GLOBULIN SER CALC-MCNC: 3.7 G/DL (ref 2–4)
GLUCOSE SERPL-MCNC: 100 MG/DL (ref 74–99)
HCT VFR BLD AUTO: 35.5 % (ref 35–45)
HGB BLD-MCNC: 11.3 G/DL (ref 12–16)
INR PPP: 1.1 (ref 0.8–1.2)
IRON SATN MFR SERPL: 20 % (ref 20–50)
IRON SERPL-MCNC: 67 UG/DL (ref 50–175)
LYMPHOCYTES # BLD: 1.5 K/UL (ref 0.9–3.6)
LYMPHOCYTES NFR BLD: 25 % (ref 21–52)
MCH RBC QN AUTO: 31.4 PG (ref 24–34)
MCHC RBC AUTO-ENTMCNC: 31.8 G/DL (ref 31–37)
MCV RBC AUTO: 98.6 FL (ref 74–97)
MONOCYTES # BLD: 0.4 K/UL (ref 0.05–1.2)
MONOCYTES NFR BLD: 7 % (ref 3–10)
NEUTS SEG # BLD: 3.9 K/UL (ref 1.8–8)
NEUTS SEG NFR BLD: 65 % (ref 40–73)
PLATELET # BLD AUTO: 171 K/UL (ref 135–420)
PMV BLD AUTO: 10.3 FL (ref 9.2–11.8)
POTASSIUM SERPL-SCNC: 4.4 MMOL/L (ref 3.5–5.5)
PROT SERPL-MCNC: 8 G/DL (ref 6.4–8.2)
PROTHROMBIN TIME: 14.3 SEC (ref 11.5–15.2)
RBC # BLD AUTO: 3.6 M/UL (ref 4.2–5.3)
SODIUM SERPL-SCNC: 137 MMOL/L (ref 136–145)
TIBC SERPL-MCNC: 331 UG/DL (ref 250–450)
WBC # BLD AUTO: 6 K/UL (ref 4.6–13.2)

## 2021-06-21 PROCEDURE — 36415 COLL VENOUS BLD VENIPUNCTURE: CPT

## 2021-06-21 PROCEDURE — 85610 PROTHROMBIN TIME: CPT

## 2021-06-21 PROCEDURE — 82390 ASSAY OF CERULOPLASMIN: CPT

## 2021-06-21 PROCEDURE — 80076 HEPATIC FUNCTION PANEL: CPT

## 2021-06-21 PROCEDURE — 86708 HEPATITIS A ANTIBODY: CPT

## 2021-06-21 PROCEDURE — 82728 ASSAY OF FERRITIN: CPT

## 2021-06-21 PROCEDURE — 83516 IMMUNOASSAY NONANTIBODY: CPT

## 2021-06-21 PROCEDURE — 99203 OFFICE O/P NEW LOW 30 MIN: CPT | Performed by: INTERNAL MEDICINE

## 2021-06-21 PROCEDURE — 82103 ALPHA-1-ANTITRYPSIN TOTAL: CPT

## 2021-06-21 PROCEDURE — 85025 COMPLETE CBC W/AUTO DIFF WBC: CPT

## 2021-06-21 PROCEDURE — 86038 ANTINUCLEAR ANTIBODIES: CPT

## 2021-06-21 PROCEDURE — 83540 ASSAY OF IRON: CPT

## 2021-06-21 PROCEDURE — 80048 BASIC METABOLIC PNL TOTAL CA: CPT

## 2021-06-21 PROCEDURE — 86256 FLUORESCENT ANTIBODY TITER: CPT

## 2021-06-21 NOTE — Clinical Note
7/4/2021    Patient: Roxanne Swain   YOB: 1970   Date of Visit: 6/21/2021     Prasanna Smalls MD  [address] [address]  Via Fax: [phone]    Dear Prasanna Smalls MD,      Thank you for referring Ms. Jasmyn Venegas to [address] [place] [address],[address] for evaluation. My notes for this consultation are attached. If you have questions, please do not hesitate to call me. I look forward to following your patient along with you.       Sincerely,    Nuris Fuentes MD

## 2021-06-21 NOTE — PROGRESS NOTES
[address] [facility]      Agustina Moran MD, Álvaro Duval, Margareth Hurtado MD, MPH      Renetta Thakur, RONNIE Loyd, [facility]     Ama Faulkner, [facility]   Danay Dominguez, P-C    Katia Mansfield, [facility]       Yulissa Lucieshira [facility] 136    at [address], [facility] [place]  22. [phone]    FAX: [address] [place] [address] [address], [address]    [phone]    FAX: [phone]         Patient Care Team:  Madhavi Barragan MD as PCP - General (Family Medicine)      Problem List  Date Reviewed: 6/21/2021        Codes Class Noted    Elevated alkaline phosphatase level ICD-10-CM: R74.8  ICD-9-CM: 790.5  6/21/2021        ESRD on dialysis [facility]) ICD-10-CM: N18.6, Z99.2  ICD-9-CM: 585.6, V45.11  6/21/2021        Hypercholesterolemia ICD-10-CM: E78.00  ICD-9-CM: 272.0  6/21/2021              The clinicians listed above have asked me to see Traci Adan in consultation regarding elevated liver enzymes and its management. All medical records sent by the referring physicians were reviewed   including imaging studies     The patient is a 49 y.o. Black female who was found to have elevated alkaline phosphatase in 11/2018. Serologic evaluation for markers of chronic liver disease was negative for HCV, HBV,     Ultrasound of the liver was performed in 11/2020. The results of the imaging suggested chronic liver disease. The patientdoes not have any symptoms which could be attributed to the liver disorder.   Fatigue on dialysis days    The patient is not experiencing the following symptoms which are commonly seen in this liver disorder:   fevers,   chills,   pain in the right side over the liver,     The patient completes all daily activities without any functional limitations. ASSESSMENT AND PLAN:  Elevated liver enzymes  Persistent elevation in alkaline phosphatase of unclear etiology at this time. Liver transaminases are normal.  Liver function is normal.  The platelet count is normal.      Serologic testing for causes of chronic liver disease was ordered. All was negative     The most likely causes for the liver chemistry abnormalities were discussed with the patient and include immune liver disorders,     The need to perform an assessment of liver fibrosis was discussed with the patient. The Fibroscan can assess liver fibrosis and determine if a patient has advanced fibrosis or cirrhosis without the need for liver biopsy. This will be performed at the next office visit. If the Fibroscan suggests advanced fibrosis then a liver biopsy should be considered. The Fibroscan can be repeated annually or as often as clinically indicated to assess for fibrosis progression and/or regression. Have performed laboratory testing to monitor liver function and degree of liver injury. This included BMP, hepatic panel, CBC with platelet count, INR. Will perform imaging of the liver with MRI and MRCP because of persistent elevation in ALP and possible bile duct disease. Screening for Hepatocellular Carcinoma  HCC screening is not necessary if the patient has no evidence of cirrhosis. Treatment of other medical problems in patients with chronic liver disease  There are no contraindications for the patient to take most medications that are necessary for treatment of other medical issues. Counseling for alcohol in patients with chronic liver disease  The patient was counseled regarding alcohol consumption and the effect of alcohol on chronic liver disease. The patient does not consume any significant amount of alcohol.     Vaccinations   Vaccination for viral hepatitis A is recommended since the patient has no serologic evidence of previous exposure or vaccination with immunity. Routine vaccinations against other bacterial and viral agents can be performed as indicated. Annual flu vaccination should be administered if indicated. ALLERGIES  Allergies   Allergen Reactions    Flagyl [Metronidazole] Rash    Gentamicin Rash    Pcn [Penicillins] Rash    Percocet [Oxycodone-Acetaminophen] Herpetiformis Dermatitis       MEDICATIONS  Current Outpatient Medications   Medication Sig    cinacalcet (SENSIPAR) 30 mg tablet Take 30 mg by mouth daily.  calcitRIOL (ROCALTROL) 0.5 mcg capsule Take 0.5 mcg by mouth daily.  atorvastatin (LIPITOR) 40 mg tablet Take 40 mg by mouth daily.  sevelamer carbonate (RENVELA) 800 mg tab tab Take 800 mg by mouth three (3) times daily (with meals). No current facility-administered medications for this visit. SYSTEM REVIEW NOT RELATED TO LIVER DISEASE OR REVIEWED ABOVE:  Constitution systems: Negative for fever, chills, weight gain, weight loss. Eyes: Negative for visual changes. ENT: Negative for sore throat, painful swallowing. Respiratory: Negative for cough, hemoptysis, SOB. Cardiology: Negative for chest pain, palpitations. GI:  Negative for constipation or diarrhea. : Negative for urinary frequency, dysuria, hematuria, nocturia. Skin: Negative for rash. Hematology: Negative for easy bruising, blood clots. Musculo-skelatal: Negative for back pain, muscle pain, weakness. Neurologic: Negative for headaches, dizziness, vertigo, memory problems not related to HE. Psychology: Negative for anxiety, depression. FAMILY HISTORY:  The father Has/had the following following chronic disease(s): None. The mother Has/had the following chronic disease(s): None. There is no family history of liver disease. SOCIAL HISTORY:  The patient has never been . The patient has 4 children,   The patient has never used tobacco products.     The patient has never consumed significant amounts of alcohol. The patient does not work outside the home. PHYSICAL EXAMINATION:  Visit Vitals  BP (!) 82/48 Comment: Pt said thats typical  of low bp after dialysis   Pulse 91   Temp 99.1 °F (37.3 °C) (Tympanic)   Ht 5' 9\" (1.753 m)   Wt 263 lb 8 oz (119.5 kg)   SpO2 96%   BMI 38.91 kg/m²     General: No acute distress. Eyes: Sclera anicteric. ENT: No oral lesions. Thyroid normal.  Nodes: No adenopathy. Skin: No spider angiomata. No jaundice. No palmar erythema. Respiratory: Lungs clear to auscultation. Cardiovascular: Regular heart rate. No murmurs. No JVD. Abdomen: Soft non-tender. Liver size normal to percussion/palpation. Spleen not palpable. No obvious ascites. Extremities: No edema. No muscle wasting. No gross arthritic changes. Neurologic: Alert and oriented. Cranial nerves grossly intact. No asterixis. LABORATORY STUDIES:  Liver [place] of [address] Units 6/21/2021   WBC 4.6 - 13.2 K/uL 6.0   ANC 1.8 - 8.0 K/UL 3.9   HGB 12.0 - 16.0 g/dL 11.3 (L)    - 420 K/uL 171   INR 0.8 - 1.2   1.1   AST 10 - 38 U/L 8 (L)   ALT 13 - 56 U/L 22   Alk Phos 45 - 117 U/L 281 (H)   Bili, Total 0.2 - 1.0 MG/DL 0.3   Bili, Direct 0.0 - 0.2 MG/DL 0.1   Albumin 3.4 - 5.0 g/dL 4.3   BUN 7.0 - 18 MG/DL 57 (H)   Creat 0.6 - 1.3 MG/DL 13.40 (H)   Na 136 - 145 mmol/L 137   K 3.5 - 5.5 mmol/L 4.4   Cl 100 - 111 mmol/L 97 (L)   CO2 21 - 32 mmol/L 31   Glucose 74 - 99 mg/dL 100 (H)     SEROLOGIES:  11/2020. HBsurface antigen negative, Anti-HBcore negative, anti-HBsurface positive, anti-HCV negative.     Serologies Latest Ref Rng & Units 6/21/2021   Hep A Ab, Total Negative   Negative   Ferritin 8 - 388 NG/ (H)   Iron % Saturation 20 - 50 % 20   CORI, IFA  Negative   C-ANCA Neg:<1:20 titer <1:20   P-ANCA Neg:<1:20 titer <1:20   ANCA Neg:<1:20 titer <1:20   ASMCA 0 - 19 Units 6   M2 Ab 0.0 - 20.0 Units <20.0   Ceruloplasmin 19.0 - 39.0 mg/dL 27.5   Alpha-1 antitrypsin level 101 - 187 mg/dL 146     LIVER HISTOLOGY:  Not available or performed    ENDOSCOPIC PROCEDURES:  Not available or performed    RADIOLOGY:  Not available or performed    OTHER TESTING:  Not available or performed    FOLLOW-UP:  All of the issues listed above in the Assessment and Plan were discussed with the patient. All questions were answered. The patient expressed a clear understanding of the above. [address] [address] in 2 weeks for Fibroscan to review all data and determine the treatment plan.       Phuc Ulloa MD  [address]  [address], 300 [facility] [address]  [address] [place] [address]

## 2021-06-22 LAB
A1AT SERPL-MCNC: 146 MG/DL (ref 101–187)
ACTIN IGG SERPL-ACNC: 6 UNITS (ref 0–19)
ANA TITR SER IF: NEGATIVE {TITER}
C-ANCA TITR SER IF: NORMAL TITER
CERULOPLASMIN SERPL-MCNC: 27.5 MG/DL (ref 19–39)
HAV AB SER QL IA: NEGATIVE
MITOCHONDRIA M2 IGG SER-ACNC: <20 UNITS (ref 0–20)
P-ANCA ATYPICAL TITR SER IF: NORMAL TITER
P-ANCA TITR SER IF: NORMAL TITER

## 2021-08-02 ENCOUNTER — DOCUMENTATION ONLY (OUTPATIENT)
Dept: HEMATOLOGY | Age: 51
End: 2021-08-02

## 2021-08-02 NOTE — PROGRESS NOTES
Spoke to patient informed that her appointment had to be rescheduled by our office. Patient unable to schedule at this time will call office back .

## 2021-10-11 ENCOUNTER — OFFICE VISIT (OUTPATIENT)
Dept: HEMATOLOGY | Age: 51
End: 2021-10-11
Payer: MEDICARE

## 2021-10-11 VITALS
SYSTOLIC BLOOD PRESSURE: 109 MMHG | TEMPERATURE: 96.8 F | HEART RATE: 90 BPM | DIASTOLIC BLOOD PRESSURE: 43 MMHG | WEIGHT: 258.13 LBS | OXYGEN SATURATION: 99 % | BODY MASS INDEX: 38.12 KG/M2

## 2021-10-11 DIAGNOSIS — R74.8 ELEVATED ALKALINE PHOSPHATASE LEVEL: Primary | ICD-10-CM

## 2021-10-11 PROCEDURE — G8417 CALC BMI ABV UP PARAM F/U: HCPCS | Performed by: INTERNAL MEDICINE

## 2021-10-11 PROCEDURE — 99214 OFFICE O/P EST MOD 30 MIN: CPT | Performed by: INTERNAL MEDICINE

## 2021-10-11 PROCEDURE — 91200 LIVER ELASTOGRAPHY: CPT | Performed by: INTERNAL MEDICINE

## 2021-10-11 PROCEDURE — 3017F COLORECTAL CA SCREEN DOC REV: CPT | Performed by: INTERNAL MEDICINE

## 2021-10-11 PROCEDURE — G8427 DOCREV CUR MEDS BY ELIG CLIN: HCPCS | Performed by: INTERNAL MEDICINE

## 2021-10-11 PROCEDURE — G8510 SCR DEP NEG, NO PLAN REQD: HCPCS | Performed by: INTERNAL MEDICINE

## 2021-10-11 NOTE — Clinical Note
10/26/2021    Patient: Jamison Field   YOB: 1970   Date of Visit: 10/11/2021     Bahman Ramirez MD  [address] [place] [address]  Via Fax: [phone]    Dear Bahman Ramirez MD,      Thank you for referring Ms. Kait Paul to [address] [place] [address],[address] for evaluation. My notes for this consultation are attached. If you have questions, please do not hesitate to call me. I look forward to following your patient along with you.       Sincerely,    Marilu Ravi MD

## 2021-10-11 NOTE — PROGRESS NOTES
[facility] [address] [facility] [address]      Kelsi Niño MD, Sanjiv Jenkins MD, MPH      Alli Canela, PA-PATRICIA [place], [facility]     Ama Faulkner [facility]   AIYANA Ramirez    Dionree Alcala, [facility]       [facility] 136    at [address], [facility] [place]  22.    [phone]    FAX: [address] [place] [address], [address]    [phone]    FAX: [phone]         Patient Care Team:  Csear Briseno MD as PCP - General (Family Medicine)      Problem List  Date Reviewed: 7/4/2021        Codes Class Noted    Elevated alkaline phosphatase level ICD-10-CM: R74.8  ICD-9-CM: 790.5  6/21/2021        ESRD on dialysis [facility]) ICD-10-CM: N18.6, Z99.2  ICD-9-CM: 585.6, V45.11  6/21/2021        Hypercholesterolemia ICD-10-CM: E78.00  ICD-9-CM: 272.0  6/21/2021        Migraine headache ICD-10-CM: Q13.933  ICD-9-CM: 346.90  6/21/2021              Evans Falcon is being seen at [address] for management of elevated alkaline phosphatase. The active problem list, all pertinent past medical history, medications, radiologic findings and laboratory findings related to the liver disorder were reviewed and discussed with the patient. The patient is a 49 y.o. Black female who was found to have elevated alkaline phosphatase in 11/2018. Serologic evaluation for markers of chronic liver disease was negative    Ultrasound of the liver was performed in 11/2020. The results of the imaging suggested chronic liver disease. Assessment of liver fibrosis with Fibroscan was performed in the office today. The result was 7.4 kPa which correlates with stage 1 portal fibrosis.   The CAP score of 252 suggests hepatic steatosis. The patient does not have any symptoms which could be attributed to the liver disorder. Fatigue on dialysis days    The patient is not experiencing the following symptoms which are commonly seen in this liver disorder:   fevers,   chills,   pain in the right side over the liver,     The patient completes all daily activities without any functional limitations. ASSESSMENT AND PLAN:  Elevated liver enzymes  Persistent elevation in alkaline phosphatase of unclear etiology at this time. Liver transaminases are normal.  Liver function is normal.  The platelet count is normal.      Serologic testing for causes of chronic liver disease was negative     Fibroscan in 10/2021 demonstrated  7.4 kPa and  suggesting fatty liver and stage 1 portal fibrosis    The most likely causes for the liver chemistry abnormalities were discussed with the patient and include immune liver disorders, fatty liver    Will perform imaging of the liver with MRI and MRCP because of persistent elevation in ALP and possible bile duct disease. The need to perform a liver biopsy to help determine the cause and severity of the liver test abnormalities was discussed. The risks of performing the liver biopsy including pain, puncture of the lung, gallbladder, intestine or kidney and bleeding were discussed. The patient has decided to have a liver biopsy. This will be scheduled. Screening for Hepatocellular Carcinoma  HCC screening is not necessary if the patient has no evidence of cirrhosis. Treatment of other medical problems in patients with chronic liver disease  There are no contraindications for the patient to take most medications that are necessary for treatment of other medical issues. Counseling for alcohol in patients with chronic liver disease  The patient was counseled regarding alcohol consumption and the effect of alcohol on chronic liver disease.   The patient does not consume any significant amount of alcohol. Vaccinations   Vaccination for viral hepatitis A is recommended since the patient has no serologic evidence of previous exposure or vaccination with immunity. Routine vaccinations against other bacterial and viral agents can be performed as indicated. Annual flu vaccination should be administered if indicated. ALLERGIES  Allergies   Allergen Reactions    Flagyl [Metronidazole] Rash    Gentamicin Rash    Pcn [Penicillins] Rash    Percocet [Oxycodone-Acetaminophen] Herpetiformis Dermatitis       MEDICATIONS  Current Outpatient Medications   Medication Sig    cinacalcet (SENSIPAR) 30 mg tablet Take 30 mg by mouth daily.  calcitRIOL (ROCALTROL) 0.5 mcg capsule Take 0.5 mcg by mouth daily.  atorvastatin (LIPITOR) 40 mg tablet Take 40 mg by mouth daily.  sevelamer carbonate (RENVELA) 800 mg tab tab Take 800 mg by mouth three (3) times daily (with meals). No current facility-administered medications for this visit. SYSTEM REVIEW NOT RELATED TO LIVER DISEASE OR REVIEWED ABOVE:  Constitution systems: Negative for fever, chills, weight gain, weight loss. Eyes: Negative for visual changes. ENT: Negative for sore throat, painful swallowing. Respiratory: Negative for cough, hemoptysis, SOB. Cardiology: Negative for chest pain, palpitations. GI:  Negative for constipation or diarrhea. : Negative for urinary frequency, dysuria, hematuria, nocturia. Skin: Negative for rash. Hematology: Negative for easy bruising, blood clots. Musculo-skelatal: Negative for back pain, muscle pain, weakness. Neurologic: Negative for headaches, dizziness, vertigo, memory problems not related to HE. Psychology: Negative for anxiety, depression. FAMILY HISTORY:  The father Has/had the following following chronic disease(s): None. The mother Has/had the following chronic disease(s): None. There is no family history of liver disease.       SOCIAL HISTORY:  The patient has never been . The patient has 4 children,   The patient has never used tobacco products. The patient has never consumed significant amounts of alcohol. The patient does not work outside the home. PHYSICAL EXAMINATION:  Visit Vitals  BP (!) 109/43   Pulse 90   Temp 96.8 °F (36 °C) (Tympanic)   Wt 258 lb 2 oz (117.1 kg)   SpO2 99%   BMI 38.12 kg/m²     General: No acute distress. Eyes: Sclera anicteric. ENT: No oral lesions. Thyroid normal.  Nodes: No adenopathy. Skin: No spider angiomata. No jaundice. No palmar erythema. Respiratory: Lungs clear to auscultation. Cardiovascular: Regular heart rate. No murmurs. No JVD. Abdomen: Soft non-tender. Liver size normal to percussion/palpation. Spleen not palpable. No obvious ascites. Extremities: No edema. No muscle wasting. No gross arthritic changes. Neurologic: Alert and oriented. Cranial nerves grossly intact. No asterixis. LABORATORY STUDIES:  Liver [place] of [address] Units 6/21/2021   WBC 4.6 - 13.2 K/uL 6.0   ANC 1.8 - 8.0 K/UL 3.9   HGB 12.0 - 16.0 g/dL 11.3 (L)    - 420 K/uL 171   INR 0.8 - 1.2   1.1   AST 10 - 38 U/L 8 (L)   ALT 13 - 56 U/L 22   Alk Phos 45 - 117 U/L 281 (H)   Bili, Total 0.2 - 1.0 MG/DL 0.3   Bili, Direct 0.0 - 0.2 MG/DL 0.1   Albumin 3.4 - 5.0 g/dL 4.3   BUN 7.0 - 18 MG/DL 57 (H)   Creat 0.6 - 1.3 MG/DL 13.40 (H)   Na 136 - 145 mmol/L 137   K 3.5 - 5.5 mmol/L 4.4   Cl 100 - 111 mmol/L 97 (L)   CO2 21 - 32 mmol/L 31   Glucose 74 - 99 mg/dL 100 (H)     SEROLOGIES:  11/2020. HBsurface antigen negative, Anti-HBcore negative, anti-HBsurface positive, anti-HCV negative.     Serologies Latest Ref Rng & Units 6/21/2021   Hep A Ab, Total Negative   Negative   Ferritin 8 - 388 NG/ (H)   Iron % Saturation 20 - 50 % 20   CORI, IFA  Negative   C-ANCA Neg:<1:20 titer <1:20   P-ANCA Neg:<1:20 titer <1:20   ANCA Neg:<1:20 titer <1:20   ASMCA 0 - 19 Units 6   M2 Ab 0.0 - 20.0 Units <20.0 Ceruloplasmin 19.0 - 39.0 mg/dL 27.5   Alpha-1 antitrypsin level 101 - 187 mg/dL 146     LIVER HISTOLOGY:  10/2021. FibroScan performed at [address]. EkPa was 7.4. IQR/med 43%. . The results suggested a fibrosis level of F1. The CAP score suggests there is hepatic steatosis. ENDOSCOPIC PROCEDURES:  2021. Colonoscopy by Dr Rj Padilla. Colon polyps. RADIOLOGY:  2020. Ultrasound of liver. Echogenic consistent with chronic liver disease or fatty liver. No liver mass lesions. No dilated bile ducts. No ascites. OTHER TESTIN2021. Cardiac catheeterization. No CAD. FOLLOW-UP:  All of the issues listed above in the Assessment and Plan were discussed with the patient. All questions were answered. The patient expressed a clear understanding of the above. [address] [address] in 2 weeks after liver biopsy.       Ge Delcid MD  [address]  4 [facility], [address] [place] [address], [address]  12 [facility]

## 2022-01-05 ENCOUNTER — HOSPITAL ENCOUNTER (OUTPATIENT)
Age: 52
Setting detail: OUTPATIENT SURGERY
Discharge: HOME | End: 2022-01-05
Attending: INTERNAL MEDICINE | Admitting: INTERNAL MEDICINE
Payer: MEDICARE

## 2022-01-05 ENCOUNTER — APPOINTMENT (OUTPATIENT)
Dept: ULTRASOUND IMAGING | Age: 52
End: 2022-01-05
Attending: INTERNAL MEDICINE
Payer: MEDICARE

## 2022-01-05 DIAGNOSIS — R74.8 ELEVATED ALKALINE PHOSPHATASE LEVEL: ICD-10-CM

## 2022-01-05 DIAGNOSIS — R79.89 ELEVATED LFTS: ICD-10-CM

## 2022-01-05 PROCEDURE — 74011000250 HC RX REV CODE- 250: Performed by: INTERNAL MEDICINE

## 2022-01-05 PROCEDURE — 76040000019: Performed by: INTERNAL MEDICINE

## 2022-01-05 PROCEDURE — 76705 ECHO EXAM OF ABDOMEN: CPT

## 2022-01-05 PROCEDURE — 77030013826 HC NDL BIOP MAXCOR BARD -B: Performed by: INTERNAL MEDICINE

## 2022-01-05 PROCEDURE — 88313 SPECIAL STAINS GROUP 2: CPT

## 2022-01-05 PROCEDURE — 88307 TISSUE EXAM BY PATHOLOGIST: CPT

## 2022-01-05 PROCEDURE — 2709999900 HC NON-CHARGEABLE SUPPLY: Performed by: INTERNAL MEDICINE

## 2022-01-05 PROCEDURE — 47000 NEEDLE BIOPSY OF LIVER PERQ: CPT | Performed by: INTERNAL MEDICINE

## 2022-01-05 PROCEDURE — 74011250636 HC RX REV CODE- 250/636: Performed by: INTERNAL MEDICINE

## 2022-01-05 PROCEDURE — 76942 ECHO GUIDE FOR BIOPSY: CPT | Performed by: INTERNAL MEDICINE

## 2022-01-05 RX ORDER — ACETAMINOPHEN 500 MG
1000 TABLET ORAL
COMMUNITY

## 2022-01-05 RX ORDER — LIDOCAINE HYDROCHLORIDE 10 MG/ML
10 INJECTION INFILTRATION; PERINEURAL ONCE
Status: CANCELLED | OUTPATIENT
Start: 2022-01-05 | End: 2022-01-05

## 2022-01-05 RX ORDER — SODIUM CHLORIDE 0.9 % (FLUSH) 0.9 %
5-40 SYRINGE (ML) INJECTION AS NEEDED
Status: CANCELLED | OUTPATIENT
Start: 2022-01-05

## 2022-01-05 RX ORDER — ONDANSETRON 2 MG/ML
4 INJECTION INTRAMUSCULAR; INTRAVENOUS
Status: CANCELLED | OUTPATIENT
Start: 2022-01-05

## 2022-01-05 RX ORDER — FENTANYL CITRATE 50 UG/ML
25 INJECTION, SOLUTION INTRAMUSCULAR; INTRAVENOUS
Status: DISCONTINUED | OUTPATIENT
Start: 2022-01-05 | End: 2022-01-05 | Stop reason: HOSPADM

## 2022-01-05 RX ORDER — SODIUM CHLORIDE 0.9 % (FLUSH) 0.9 %
5-40 SYRINGE (ML) INJECTION EVERY 8 HOURS
Status: CANCELLED | OUTPATIENT
Start: 2022-01-05

## 2022-01-05 RX ORDER — LIDOCAINE HYDROCHLORIDE 10 MG/ML
INJECTION INFILTRATION; PERINEURAL AS NEEDED
Status: DISCONTINUED | OUTPATIENT
Start: 2022-01-05 | End: 2022-01-05 | Stop reason: HOSPADM

## 2022-01-05 RX ADMIN — FENTANYL CITRATE 25 MCG: 50 INJECTION INTRAMUSCULAR; INTRAVENOUS at 10:46

## 2022-01-05 RX ADMIN — FENTANYL CITRATE 25 MCG: 50 INJECTION INTRAMUSCULAR; INTRAVENOUS at 10:15

## 2022-01-05 RX ADMIN — FENTANYL CITRATE 25 MCG: 50 INJECTION INTRAMUSCULAR; INTRAVENOUS at 09:39

## 2022-01-05 NOTE — PROCEDURES
Milton Martin MD, Germán Yee MD, MPH      RONNIE Whitten, [facility]     Ama Faulkner, [facility]   AIYANA Madera, [facility]       Yulissa Gonzalez [facility] 136    at [address], [facility] Ye [place]s  22. [phone]    FAX: [address] [place] [address], [address] [place] [address] - [address] [phone]    FAX: [phone]         LIVER BIOPSY PROCEDURE NOTE    Dileep Brown  1970    INDICATIONS/PRE-OPERATIVE  DIAGNOSIS:  NAFLD  Fatty liver alcohol versus non-alcoholic  Elevated liver enzymes  Elevated liver ALP  Autoimmune hepatitis  PBC  PSC  Chronic HCV       Chronic HBV       Hemochromatosis  Evaluation of elevated liver chemistries in liver transplant recipient    : Yenni Fernandez MD    SURGICAL ASSISTANT:  None    PROSTHETIC DEVICES, TISSUE GRAFTS, TRANSPLANTED ORGANS:  Not applicable    SEDATION: 1% Lidocaine injection 15 ml    PROCEDURE:  Informed consent to perform the procedure was obtained from the patient. The patient was positioned on the edge of the stretcher lying flat in the supine position. Ultrasound was utilized to image the liver. The diaphragm and any major mass lesion or vascular structures within the liver were identified. An appropriate site for liver biopsy was identified. The distance from the surface of the skin to the liver capsule was 5 cm. This area was prepped with betadine and draped in sterile fashion. The skin was infiltrated with 1% lidocaine. The deeper subcutanous tissues and liver capsule overlying the biopsy site were then infiltrated with 1% lidocaine until appropriate anesthesia was obtained.   A small incision was made in the skin so the biopsy devise could be easily inserted. A total of 3 passes with the 16 gauge Bard biopsy devise was then made into the liver. Core(s) of liver tissue totaling 5 cm in length were obtained and placed into tissue fixative. A band aid was placed over the biopsy site. The patient was then repositioned on the right side and transported to the recovery area on the stretcher for routine monitoring until discharge. The specimen was sent to pathology for processing via the normal transport mechanism. SPECIMEN COLLECTED: Liver    INTERVENTIONS:  None    ESTIMATED BLOOD LOSS: Negligible.      COMPLICATIONS:  None    POST-OPERATIVE DIAGNOSIS: Same as Pre-operative Diagnosis      Kayleen May MD  [address]  [address] [place] [address] 58, [address] [facility] [address]  12 [facility]

## 2022-01-05 NOTE — H&P
[facility], MD, Bella Garcia MD, MPH      Tom Kyle, RONNIE Power, ACN-BC     Ama Faulkner, [facility]   Sonya AngelSANDRA barrera-PATRICIA    Iva Krista, [facility]       Yulissa Angela [facility] 136    at [address]    [address], [address] [facility]    [address] [facility] [address].    [phone]    FAX: [address] [place] [address] Avenue    [address], [address]    [phone]    FAX: [phone]         PRE-PROCEDURE NOTE - LIVER BIOPSY    H and P from last office visit reviewed. Allergies reviewed. Out-patient medication list reviewed. Patient Active Problem List   Diagnosis Code    Elevated alkaline phosphatase level R74.8    ESRD on dialysis ([facility] [address] N18.6, Z99.2    Hypercholesterolemia E78.00    Migraine headache G43.909       Allergies   Allergen Reactions    Flagyl [Metronidazole] Rash    Gentamicin Rash    Pcn [Penicillins] Rash    Percocet [Oxycodone-Acetaminophen] Herpetiformis Dermatitis       No current facility-administered medications on file prior to encounter. Current Outpatient Medications on File Prior to Encounter   Medication Sig Dispense Refill    cinacalcet (SENSIPAR) 30 mg tablet Take 30 mg by mouth daily.  calcitRIOL (ROCALTROL) 0.5 mcg capsule Take 0.5 mcg by mouth daily.  atorvastatin (LIPITOR) 40 mg tablet Take 40 mg by mouth daily.  sevelamer carbonate (RENVELA) 800 mg tab tab Take 800 mg by mouth three (3) times daily (with meals). For liver biopsy to assess Abnormal liver enzymes. The risks of the procedure were discussed with the patient. This included bleeding, pain, and puncture of other organs. All questions were answered. The patient wishes to proceed with the procedure.     The patient was counseled at length about the risks of oneal Covid-19 in the john-operative and post-operative states including the recovery window of their procedure. The patient was made aware that oneal Covid-19 after a surgical procedure may worsen their prognosis for recovering from the virus and lend to a higher morbidity and or mortality risk. The patient was given the options of postponing their procedure. All of the risks, benefits, and alternatives were discussed. The patient does  wish to proceed with the procedure. PHYSICAL EXAMINATION:  Visit Vitals  BP (!) 111/42   Pulse 84   Temp 97.9 °F (36.6 °C)   Resp 16   Ht 5' 9\" (1.753 m)   Wt 252 lb 11.2 oz (114.6 kg)   SpO2 96%   Breastfeeding No   BMI 37.32 kg/m²       General: No acute distress. Eyes: Sclera anicteric. ENT: No oral lesions. Thyroid normal.  Nodes: No adenopathy. Skin: No spider angiomata. No jaundice. No palmar erythema. Respiratory: Lungs clear to auscultation. Cardiovascular: Regular heart rate. No murmurs. No JVD. Abdomen: Soft non-tender, liver size normal to percussion/palpation. Spleen not palpable. No obvious ascites. Extremities: No edema. No muscle wasting. No gross arthritic changes. Neurologic: Alert and oriented. Cranial nerves grossly intact. No asterixis. LABS:  Lab Results   Component Value Date/Time    WBC 6.0 06/21/2021 04:23 PM    HGB 11.3 (L) 06/21/2021 04:23 PM    HCT 35.5 06/21/2021 04:23 PM    PLATELET 928 35/31/2728 04:23 PM    MCV 98.6 (H) 06/21/2021 04:23 PM     Lab Results   Component Value Date/Time    INR 1.1 06/21/2021 04:23 PM    INR 1.0 03/08/2019 01:42 PM    Prothrombin time 14.3 06/21/2021 04:23 PM    Prothrombin time 13.2 03/08/2019 01:42 PM       ASSESSMENT AND PLAN:  Liver biopsy under ultrasound guidance.     Shawn Alex MD  [address]  [address] [place] [address] [address] [facility]

## 2022-01-05 NOTE — PERIOP NOTES
Late entry - per pt \" my last menstruation was  19 years ago. \"   Telephone  Discharged  instruction given to SON/PT  and agreed with the plan. Discharged includes diet, activity limitations , medication to continue and f/u appointment. D/c to home in stable condition with care of family. Pt is a home HD PT 5xa week- 2 days free out of 7 days. Advised to take this day as her free day and resumed HD tomorrow.  Agreed with the paln- / call nephrologist it for any concerns with HD.

## 2022-01-05 NOTE — DISCHARGE INSTRUCTIONS
DISCHARGE SUMMARY from Nurse    PATIENT INSTRUCTIONS:    After general anesthesia or intravenous sedation, for 24 hours or while taking prescription Narcotics:  · Limit your activities  · Do not drive and operate hazardous machinery  · Do not make important personal or business decisions  · Do  not drink alcoholic beverages  · If you have not urinated within 8 hours after discharge, please contact your surgeon on call. Report the following to your surgeon:  · Excessive pain, swelling, redness or odor of or around the surgical area  · Temperature over 100.5  · Nausea and vomiting lasting longer than 4 hours or if unable to take medications  · Any signs of decreased circulation or nerve impairment to extremity: change in color, persistent  numbness, tingling, coldness or increase pain  · Any questions    What to do at Home:  Recommended activity: as above     If you experience any of the following symptoms as above , please follow up with Doctor Amado Rizvi. *  Please give a list of your current medications to your Primary Care Provider. *  Please update this list whenever your medications are discontinued, doses are      changed, or new medications (including over-the-counter products) are added. *  Please carry medication information at all times in case of emergency situations. These are general instructions for a healthy lifestyle:    No smoking/ No tobacco products/ Avoid exposure to second hand smoke  Surgeon General's Warning:  Quitting smoking now greatly reduces serious risk to your health.     Obesity, smoking, and sedentary lifestyle greatly increases your risk for illness    A healthy diet, regular physical exercise & weight monitoring are important for maintaining a healthy lifestyle    You may be retaining fluid if you have a history of heart failure or if you experience any of the following symptoms:  Weight gain of 3 pounds or more overnight or 5 pounds in a week, increased swelling in our hands or feet or shortness of breath while lying flat in bed. Please call your doctor as soon as you notice any of these symptoms; do not wait until your next office visit. The discharge information has been reviewed with the patient and caregiver. The patient and caregiver verbalized understanding. Discharge medications reviewed with the patient and caregiver and appropriate educational materials and side effects teaching were provided. ___________________________________________________________________________________________________________________________________[address] MD Akbar, Kirstie Cabot, Belma Cloud, MD, MPH      Phyllis Boggs, PA-PATRICIA Obando, ACNP-BC     Ama Faulkner, AGPCNP-BC   Concetta Shah FNP-C    Brynn Wilde, [facility]-BC       Yulissa Gonzalez [facility] 136    at [address], [facility] [place]  22.    [phone]    FAX: [address] [place] [address], [address] [place] [address] - [address]    [phone]    FAX: [phone]         LIVER BIOPSY DISCHARGE INSTRUCTIONS      Estelle Terrazas  1970  Date: 1/5/2022    DIET:    Akira Guallpa may resume your previous diet. ACTIVITIES:  Rest quietly the rest of today. You should not lift any objects more than 20 pounds for the next 2 days. If you work sitting down without strenuous activity you may return to work tomorrow. If you exert yourself or do heavy lifting at work you should take tomorrow off. Do not drive or operate hazardous machinery for 12 hours after you are discharged from this procedure. SPECIAL INSTRUCTIONS:  Do not use any aspirin or non-steroidal (Motin, Advil, Naproxen, etc) pain medications for the next 2 days.   You may use extra-strength Tylenol (acetaminophen) if you experience pain or discomfort later today. Restarting blood thinners: If you were taking blood thinners prior to the procedure you can restart these in 2 days. Call the [facility] office if you experience any of the following:  Persistent or severe abdominal pain. Persistent or severe abdominal distention. Fever and chills   Nausea and vomiting. New or unusual symptoms. Follow-up care: You should have a follow up appointment with Dr. Satya Wall to review the results of the liver biopsy results in 2 weeks. If you do not have an appointment please call the office at the number listed above to schedule this. Other instructions: If you have any problems or questions call the [facility] office at the phone number listed above. DISCHARGE SUMMARY from Nurse: The following personal items collected during your admission are returned to you:   Dental Appliance: Dental Appliances: None  Vision: Visual Aid: None  Hearing Aid:    Jewelry:    Clothing:    Other Valuables:    Valuables sent to safe:            Learning About Coronavirus (COVID-19)  Coronavirus (COVID-19): Overview  What is coronavirus ([id number])? The coronavirus disease (COVID-19) is caused by a virus. It is an illness that was first found in [place], [place], in December 2019. It has since spread worldwide. The virus can cause fever, cough, and trouble breathing. In severe cases, it can cause pneumonia and make it hard to breathe without help. It can cause death. Coronaviruses are a large group of viruses. They cause the common cold. They also cause more serious illnesses like Middle East respiratory syndrome (MERS) and severe acute respiratory syndrome (SARS). COVID-19 is caused by a novel coronavirus. That means it's a new type that has not been seen in people before. This virus spreads person-to-person through droplets from coughing and sneezing.  It can also spread when you are close to someone who is infected. And it can spread when you touch something that has the virus on it, such as a doorknob or a tabletop. What can you do to protect yourself from coronavirus (COVID-19)? The best way to protect yourself from getting sick is to:  · Avoid areas where there is an outbreak. · Avoid contact with people who may be infected. · Wash your hands often with soap or alcohol-based hand sanitizers. · Avoid crowds and try to stay at least 6 feet away from other people. · Wash your hands often, especially after you cough or sneeze. Use soap and water, and scrub for at least 20 seconds. If soap and water aren't available, use an alcohol-based hand . · Avoid touching your mouth, nose, and eyes. What can you do to avoid spreading the virus to others? To help avoid spreading the virus to others:  · Cover your mouth with a tissue when you cough or sneeze. Then throw the tissue in the trash. · Use a disinfectant to clean things that you touch often. · Stay home if you are sick or have been exposed to the virus. Don't go to school, work, or public areas. And don't use public transportation. · If you are sick:  ? Leave your home only if you need to get medical care. But call the doctor's office first so they know you're coming. And wear a face mask, if you have one.  ? If you have a face mask, wear it whenever you're around other people. It can help stop the spread of the virus when you cough or sneeze. ? Clean and disinfect your home every day. Use household  and disinfectant wipes or sprays. Take special care to clean things that you grab with your hands. These include doorknobs, remote controls, phones, and handles on your refrigerator and microwave. And don't forget countertops, tabletops, bathrooms, and computer keyboards. When to call for help  Call 911 anytime you think you may need emergency care. For example, call if:  · You have severe trouble breathing.  (You can't talk at all.)  · You have constant chest pain or pressure. · You are severely dizzy or lightheaded. · You are confused or can't think clearly. · Your face and lips have a blue color. · You pass out (lose consciousness) or are very hard to wake up. Call your doctor now if you develop symptoms such as:  · Shortness of breath. · Fever. · Cough. If you need to get care, call ahead to the doctor's office for instructions before you go. Make sure you wear a face mask, if you have one, to prevent exposing other people to the virus. Where can you get the latest information? The following health organizations are tracking and studying this virus. Their websites contain the most up-to-date information. Kiana Griffiths also learn what to do if you think you may have been exposed to the virus. · U.S. Centers for Disease Control and Prevention (CDC): The CDC provides updated news about the disease and travel advice. The website also tells you how to prevent the spread of infection. www.cdc.gov  · World Health Organization [facility]): WHO offers information about the virus outbreaks. WHO also has travel advice. www.who.int  Current as of: April 1, 2020               Content Version: 12.4  © 2938-3972 [employer]. Care instructions adapted under license by your healthcare professional. If you have questions about a medical condition or this instruction, always ask your healthcare professional. [address] any warranty or liability for your use of this information.

## 2022-01-07 VITALS
SYSTOLIC BLOOD PRESSURE: 103 MMHG | WEIGHT: 252.7 LBS | RESPIRATION RATE: 16 BRPM | OXYGEN SATURATION: 98 % | HEIGHT: 69 IN | DIASTOLIC BLOOD PRESSURE: 48 MMHG | TEMPERATURE: 98.1 F | HEART RATE: 81 BPM | BODY MASS INDEX: 37.43 KG/M2

## 2022-01-19 ENCOUNTER — VIRTUAL VISIT (OUTPATIENT)
Dept: HEMATOLOGY | Age: 52
End: 2022-01-19
Payer: MEDICARE

## 2022-01-19 DIAGNOSIS — R74.8 ELEVATED ALKALINE PHOSPHATASE LEVEL: Primary | ICD-10-CM

## 2022-01-19 PROCEDURE — G8432 DEP SCR NOT DOC, RNG: HCPCS | Performed by: INTERNAL MEDICINE

## 2022-01-19 PROCEDURE — G8427 DOCREV CUR MEDS BY ELIG CLIN: HCPCS | Performed by: INTERNAL MEDICINE

## 2022-01-19 PROCEDURE — 99214 OFFICE O/P EST MOD 30 MIN: CPT | Performed by: INTERNAL MEDICINE

## 2022-01-19 PROCEDURE — G8417 CALC BMI ABV UP PARAM F/U: HCPCS | Performed by: INTERNAL MEDICINE

## 2022-01-19 PROCEDURE — 3017F COLORECTAL CA SCREEN DOC REV: CPT | Performed by: INTERNAL MEDICINE

## 2022-01-19 NOTE — PROGRESS NOTES
Jean Marie [address]      Gallo Carr MD, Fanny Guillen, Hazel Lyles MD, MPH      Vidal Wang, PA-PATRICIA Sandoval, ACNP-BC     Ama Faulkner, [facility]   Glynn Ugalde, FNP-C    Jhonny Orozco, [facility]       Yulissa Angela [facility] 136    at [address]    [address] [place] [address], [address] [facility], Ye  22.    [phone]    FAX: [address] [place] [address] Avenue    [address], [address], [address]    [phone]    FAX: [phone]         Patient Care Team:  Dedrick Favre, MD as PCP - General (Family Medicine)      Problem List  Date Reviewed: 1/30/2022          Codes Class Noted    GERD (gastroesophageal reflux disease) ICD-10-CM: K21.9  ICD-9-CM: 530.81  Unknown        Elevated alkaline phosphatase level ICD-10-CM: R74.8  ICD-9-CM: 790.5  6/21/2021        ESRD on dialysis [facility]) ICD-10-CM: N18.6, Z99.2  ICD-9-CM: 585.6, V45.11  6/21/2021        Hypercholesterolemia ICD-10-CM: E78.00  ICD-9-CM: 272.0  6/21/2021        Migraine headache ICD-10-CM: K18.402  ICD-9-CM: 346.90  6/21/2021                Amanuel Morgan is being seen at [address] for management of elevated alkaline phosphatase. The active problem list, all pertinent past medical history, medications, radiologic findings and laboratory findings related to the liver disorder were reviewed and discussed with the patient. The patient is a 49 y.o. Black female who was found to have elevated alkaline phosphatase in 11/2018. Serologic evaluation for markers of chronic liver disease was negative    Ultrasound of the liver was performed in 11/2020. The results of the imaging suggested chronic liver disease. Fibroscan performed in 10/2021 was 7.4 kPa which correlates with stage 1 portal fibrosis.   The CAP score of 252 suggests hepatic steatosis. The patient underwent a liver biopsy in 1/2022. The procedure was well tolerated. I have personally reviewed and interpreted the liver biopsy slides. This demonstrates mild portal inflammation and portal fibrosis. The patient does not have any symptoms which could be attributed to the liver disorder. Fatigue on dialysis days    The patient is not experiencing the following symptoms which are commonly seen in this liver disorder:   fevers,   chills,   pain in the right side over the liver,     The patient completes all daily activities without any functional limitations. ASSESSMENT AND PLAN:  Elevated liver enzymes  Persistent elevation in alkaline phosphatase of unclear etiology at this time. Liver transaminases are normal.  Liver function is normal.  The platelet count is normal.      Serologic testing for causes of chronic liver disease was negative     Liver biopsy performed in 1/2022 demonstrated mild portal inflammation and portal fibrosis. This pattern is consistent with [facility]    Fibroscan in performed 10/2021 demonstrated  7.4 kPa and  suggesting fatty liver and stage 1 portal fibrosis    Will perform imaging of the liver with MRI and MRCP because of persistent elevation in ALP and possible bile duct disease. Will obtain IGG4. Screening for Hepatocellular Carcinoma  HCC screening is not necessary if the patient has no evidence of cirrhosis. Treatment of other medical problems in patients with chronic liver disease  There are no contraindications for the patient to take most medications that are necessary for treatment of other medical issues. Counseling for alcohol in patients with chronic liver disease  The patient was counseled regarding alcohol consumption and the effect of alcohol on chronic liver disease. The patient does not consume any significant amount of alcohol.     Vaccinations   Vaccination for viral hepatitis A is recommended since the patient has no serologic evidence of previous exposure or vaccination with immunity. Routine vaccinations against other bacterial and viral agents can be performed as indicated. Annual flu vaccination should be administered if indicated. ALLERGIES  Allergies   Allergen Reactions    Flagyl [Metronidazole] Rash    Gentamicin Rash    Pcn [Penicillins] Rash    Percocet [Oxycodone-Acetaminophen] Herpetiformis Dermatitis       MEDICATIONS  Current Outpatient Medications   Medication Sig    OTHER daily as needed. GABAPENTIN  LIQUID    acetaminophen (Tylenol Extra Strength) 500 mg tablet Take 1,000 mg by mouth every six (6) hours as needed for Pain.  cinacalcet (SENSIPAR) 30 mg tablet Take 30 mg by mouth daily.  calcitRIOL (ROCALTROL) 0.5 mcg capsule Take 0.5 mcg by mouth daily.  atorvastatin (LIPITOR) 40 mg tablet Take 40 mg by mouth daily.  sevelamer carbonate (RENVELA) 800 mg tab tab Take 800 mg by mouth three (3) times daily (with meals). No current facility-administered medications for this visit. SYSTEM REVIEW NOT RELATED TO LIVER DISEASE OR REVIEWED ABOVE:  Constitution systems: Negative for fever, chills, weight gain, weight loss. Eyes: Negative for visual changes. ENT: Negative for sore throat, painful swallowing. Respiratory: Negative for cough, hemoptysis, SOB. Cardiology: Negative for chest pain, palpitations. GI:  Negative for constipation or diarrhea. : Negative for urinary frequency, dysuria, hematuria, nocturia. Skin: Negative for rash. Hematology: Negative for easy bruising, blood clots. Musculo-skelatal: Negative for back pain, muscle pain, weakness. Neurologic: Negative for headaches, dizziness, vertigo, memory problems not related to HE. Psychology: Negative for anxiety, depression. FAMILY HISTORY:  The father Has/had the following following chronic disease(s): None.     The mother Has/had the following chronic disease(s): None.    There is no family history of liver disease. SOCIAL HISTORY:  The patient has never been . The patient has 4 children,   The patient has never used tobacco products. The patient has never consumed significant amounts of alcohol. The patient does not work outside the home. PHYSICAL EXAMINATION:  There were no vitals taken for this visit. General: No acute distress. Eyes: Sclera anicteric. ENT: No oral lesions. Thyroid normal.  Nodes: No adenopathy. Skin: No spider angiomata. No jaundice. No palmar erythema. Respiratory: Lungs clear to auscultation. Cardiovascular: Regular heart rate. No murmurs. No JVD. Abdomen: Soft non-tender. Liver size normal to percussion/palpation. Spleen not palpable. No obvious ascites. Extremities: No edema. No muscle wasting. No gross arthritic changes. Neurologic: Alert and oriented. Cranial nerves grossly intact. No asterixis. LABORATORY STUDIES:  Liver [place] of [address] Units 6/21/2021   WBC 4.6 - 13.2 K/uL 6.0   ANC 1.8 - 8.0 K/UL 3.9   HGB 12.0 - 16.0 g/dL 11.3 (L)    - 420 K/uL 171   INR 0.8 - 1.2   1.1   AST 10 - 38 U/L 8 (L)   ALT 13 - 56 U/L 22   Alk Phos 45 - 117 U/L 281 (H)   Bili, Total 0.2 - 1.0 MG/DL 0.3   Bili, Direct 0.0 - 0.2 MG/DL 0.1   Albumin 3.4 - 5.0 g/dL 4.3   BUN 7.0 - 18 MG/DL 57 (H)   Creat 0.6 - 1.3 MG/DL 13.40 (H)   Na 136 - 145 mmol/L 137   K 3.5 - 5.5 mmol/L 4.4   Cl 100 - 111 mmol/L 97 (L)   CO2 21 - 32 mmol/L 31   Glucose 74 - 99 mg/dL 100 (H)     SEROLOGIES:  11/2020. HBsurface antigen negative, Anti-HBcore negative, anti-HBsurface positive, anti-HCV negative.     Serologies Latest Ref Rng & Units 6/21/2021   Hep A Ab, Total Negative   Negative   Ferritin 8 - 388 NG/ (H)   Iron % Saturation 20 - 50 % 20   CORI, IFA  Negative   C-ANCA Neg:<1:20 titer <1:20   P-ANCA Neg:<1:20 titer <1:20   ANCA Neg:<1:20 titer <1:20   ASMCA 0 - 19 Units 6   M2 Ab 0.0 - 20.0 Units <20.0   Ceruloplasmin 19.0 - 39.0 mg/dL 27.5   Alpha-1 antitrypsin level 101 - 187 mg/dL 146     LIVER HISTOLOGY:  10/2021. FibroScan performed at [address]. EkPa was 7.4. IQR/med 43%. . The results suggested a fibrosis level of F1. The CAP score suggests there is hepatic steatosis. 2022. Slides reviewed by MLS. Mild portal inflammation, with no interface hepatitis, with no PMN. Bile ducts are normal.  no lobular inflammation. no steatosis. Colby stage 2 fibrosis. Metavir stage 1 fibrosis. ENDOSCOPIC PROCEDURES:  2021. Colonoscopy by Dr Kaleigh Acosta. Colon polyps. RADIOLOGY:  2020. Ultrasound of liver. Echogenic consistent with chronic liver disease or fatty liver. No liver mass lesions. No dilated bile ducts. No ascites. OTHER TESTIN2021. Cardiac catheeterization. No CAD. FOLLOW-UP:  All of the issues listed above in the Assessment and Plan were discussed with the patient. All questions were answered. The patient expressed a clear understanding of the above. [facility] 87 in 4 weeks which should be 1-2 weeks after the next imaging study.         Germania Pena MD  [address]  4 [facility], [address] [place] [address] Jennifer Knott, [address]  [address] [place] [address]

## 2022-02-16 ENCOUNTER — HOSPITAL ENCOUNTER (OUTPATIENT)
Dept: MRI IMAGING | Age: 52
Discharge: HOME | End: 2022-02-16
Attending: INTERNAL MEDICINE
Payer: MEDICARE

## 2022-02-16 DIAGNOSIS — R74.8 ELEVATED ALKALINE PHOSPHATASE LEVEL: ICD-10-CM

## 2022-02-16 PROCEDURE — 74181 MRI ABDOMEN W/O CONTRAST: CPT

## 2022-02-23 ENCOUNTER — OFFICE VISIT (OUTPATIENT)
Dept: HEMATOLOGY | Age: 52
End: 2022-02-23
Payer: MEDICARE

## 2022-02-23 VITALS
WEIGHT: 253.4 LBS | SYSTOLIC BLOOD PRESSURE: 115 MMHG | HEART RATE: 99 BPM | OXYGEN SATURATION: 98 % | BODY MASS INDEX: 37.53 KG/M2 | HEIGHT: 69 IN | DIASTOLIC BLOOD PRESSURE: 85 MMHG | RESPIRATION RATE: 16 BRPM

## 2022-02-23 DIAGNOSIS — R74.8 ELEVATED ALKALINE PHOSPHATASE LEVEL: Primary | ICD-10-CM

## 2022-02-23 PROCEDURE — 99214 OFFICE O/P EST MOD 30 MIN: CPT | Performed by: INTERNAL MEDICINE

## 2022-02-23 NOTE — PROGRESS NOTES
Soren Nguyen [address]      Gina Guthrie MD, Megan Mckeon, Jamison Grover MD, MPH      Emily Robertson, PA-PATRICIA Carver, [facility]     April SHAYY Faulkner, [facility]   Tiffany Gutiérrez, P-C    Gean Cheadle, [facility]       Yulissatravis FaulknerUNM [facility] Darryn De Cardenas 136    at [address], [address] [place] [address] 22.    [phone]    FAX: [address] [place] [address]    at [address], [address]    [phone]    FAX: [phone]         Patient Care Team:  Betty Joseph MD as PCP - General (Family Medicine)      Problem List  Date Reviewed: 1/30/2022          Codes Class Noted    GERD (gastroesophageal reflux disease) ICD-10-CM: K21.9  ICD-9-CM: 530.81  Unknown        Elevated alkaline phosphatase level ICD-10-CM: R74.8  ICD-9-CM: 790.5  6/21/2021        ESRD on dialysis [facility]) ICD-10-CM: N18.6, Z99.2  ICD-9-CM: 585.6, V45.11  6/21/2021        Hypercholesterolemia ICD-10-CM: E78.00  ICD-9-CM: 272.0  6/21/2021        Migraine headache ICD-10-CM: N91.925  ICD-9-CM: 346.90  6/21/2021                  Joaquín Perez is being seen at [address] for management of elevated alkaline phosphatase. The active problem list, all pertinent past medical history, medications, radiologic findings and laboratory findings related to the liver disorder were reviewed and discussed with the patient. The patient is a 49 y.o. Black female who was found to have elevated alkaline phosphatase in 11/2018. Serologic evaluation for markers of chronic liver disease was negative    Ultrasound of the liver was performed in 11/2020. The results of the imaging suggested chronic liver disease. Fibroscan performed in 10/2021 was 7.4 kPa which correlates with stage 1 portal fibrosis.   The CAP score of 252 suggests hepatic steatosis. The patient underwent a liver biopsy in 1/2022. The procedure was well tolerated. I have personally reviewed and interpreted the liver biopsy slides. This demonstrates mild portal inflammation and portal fibrosis. The patient does not have any symptoms which could be attributed to the liver disorder. Fatigue on dialysis days    The patient is not experiencing the following symptoms which are commonly seen in this liver disorder:   fevers,   chills,   pain in the right side over the liver,     The patient completes all daily activities without any functional limitations. ASSESSMENT AND PLAN:  Elevated liver enzymes  Persistent elevation in alkaline phosphatase that is due to multiple biliary hamartomas. MRI in 2/2022 suggests there are multiple small cystic lesions throughout the liver consistent with bilairy harmatoma. These are benign lesions and have no clinical significance. Liver transaminases are normal.  Liver function is normal.  The platelet count is normal.      Serologic testing for causes of chronic liver disease was negative     Fibroscan in performed 10/2021 demonstrated  7.4 kPa and  suggesting fatty liver and stage 1 portal fibrosis. Liver biopsy performed in 1/2022 demonstrated mild portal inflammation and portal fibrosis. No steatosis was present. Candidate for Renal Transplant  There is no reason why the patient cannot proceed with renal transplant. The is no chronic liver disease. 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 Screening for Hepatocellular Carcinoma  HCC screening is not necessary if the patient has no evidence of cirrhosis.     Treatment of other medical problems in patients with chronic liver disease  There are no contraindications for the patient to take most medications that are necessary for treatment of other medical issues. Counseling for alcohol in patients with chronic liver disease  The patient was counseled regarding alcohol consumption and the effect of alcohol on chronic liver disease. The patient does not consume any significant amount of alcohol. Vaccinations   Vaccination for viral hepatitis A is recommended since the patient has no serologic evidence of previous exposure or vaccination with immunity. Routine vaccinations against other bacterial and viral agents can be performed as indicated. Annual flu vaccination should be administered if indicated. ALLERGIES  Allergies   Allergen Reactions    Flagyl [Metronidazole] Rash    Gentamicin Rash    Pcn [Penicillins] Rash    Percocet [Oxycodone-Acetaminophen] Herpetiformis Dermatitis       MEDICATIONS  Current Outpatient Medications   Medication Sig    OTHER daily as needed. GABAPENTIN  LIQUID    acetaminophen (Tylenol Extra Strength) 500 mg tablet Take 1,000 mg by mouth every six (6) hours as needed for Pain.  cinacalcet (SENSIPAR) 30 mg tablet Take 30 mg by mouth daily.  calcitRIOL (ROCALTROL) 0.5 mcg capsule Take 0.5 mcg by mouth daily.  atorvastatin (LIPITOR) 40 mg tablet Take 40 mg by mouth daily.  sevelamer carbonate (RENVELA) 800 mg tab tab Take 800 mg by mouth three (3) times daily (with meals). No current facility-administered medications for this visit. SYSTEM REVIEW NOT RELATED TO LIVER DISEASE OR REVIEWED ABOVE:  Constitution systems: Negative for fever, chills, weight gain, weight loss. Eyes: Negative for visual changes. ENT: Negative for sore throat, painful swallowing. Respiratory: Negative for cough, hemoptysis, SOB. Cardiology: Negative for chest pain, palpitations. GI:  Negative for constipation or diarrhea. : Negative for urinary frequency, dysuria, hematuria, nocturia. Skin: Negative for rash. Hematology: Negative for easy bruising, blood clots.     Musculo-skelatal: Negative for back pain, muscle pain, weakness. Neurologic: Negative for headaches, dizziness, vertigo, memory problems not related to HE. Psychology: Negative for anxiety, depression. FAMILY HISTORY:  The father Has/had the following following chronic disease(s): None. The mother Has/had the following chronic disease(s): None. There is no family history of liver disease. SOCIAL HISTORY:  The patient has never been . The patient has 4 children,   The patient has never used tobacco products. The patient has never consumed significant amounts of alcohol. The patient does not work outside the home. PHYSICAL EXAMINATION:  Visit Vitals  /85 (BP 1 Location: Left lower arm, BP Patient Position: Sitting, BP Cuff Size: Adult)   Pulse 99   Resp 16   Ht 5' 9\" (1.753 m)   Wt 253 lb 6.4 oz (114.9 kg)   SpO2 98%   BMI 37.42 kg/m²     General: No acute distress. Eyes: Sclera anicteric. ENT: No oral lesions. Thyroid normal.  Nodes: No adenopathy. Skin: No spider angiomata. No jaundice. No palmar erythema. Respiratory: Lungs clear to auscultation. Cardiovascular: Regular heart rate. No murmurs. No JVD. Abdomen: Soft non-tender. Liver size normal to percussion/palpation. Spleen not palpable. No obvious ascites. Extremities: No edema. No muscle wasting. No gross arthritic changes. Neurologic: Alert and oriented. Cranial nerves grossly intact. No asterixis.     LABORATORY STUDIES:  Liver [place] of [address] [place] [address] & Units 6/21/2021   WBC 4.6 - 13.2 K/uL 6.0   ANC 1.8 - 8.0 K/UL 3.9   HGB 12.0 - 16.0 g/dL 11.3 (L)    - 420 K/uL 171   INR 0.8 - 1.2   1.1   AST 10 - 38 U/L 8 (L)   ALT 13 - 56 U/L 22   Alk Phos 45 - 117 U/L 281 (H)   Bili, Total 0.2 - 1.0 MG/DL 0.3   Bili, Direct 0.0 - 0.2 MG/DL 0.1   Albumin 3.4 - 5.0 g/dL 4.3   BUN 7.0 - 18 MG/DL 57 (H)   Creat 0.6 - 1.3 MG/DL 13.40 (H)   Na 136 - 145 mmol/L 137   K 3.5 - 5.5 mmol/L 4.4   Cl 100 - 111 mmol/L 97 (L)   CO2 21 - 32 mmol/L 31   Glucose 74 - 99 mg/dL 100 (H)     SEROLOGIES:  2020. HBsurface antigen negative, Anti-HBcore negative, anti-HBsurface positive, anti-HCV negative. Serologies Latest Ref Rng & Units 2021   Hep A Ab, Total Negative   Negative   Ferritin 8 - 388 NG/ (H)   Iron % Saturation 20 - 50 % 20   CORI, IFA  Negative   C-ANCA Neg:<1:20 titer <1:20   P-ANCA Neg:<1:20 titer <1:20   ANCA Neg:<1:20 titer <1:20   ASMCA 0 - 19 Units 6   M2 Ab 0.0 - 20.0 Units <20.0   Ceruloplasmin 19.0 - 39.0 mg/dL 27.5   Alpha-1 antitrypsin level 101 - 187 mg/dL 146     LIVER HISTOLOGY:  10/2021. FibroScan performed at [address]. EkPa was 7.4. IQR/med 43%. . The results suggested a fibrosis level of F1. The CAP score suggests there is hepatic steatosis. 2022. Slides reviewed by MLS. Mild portal inflammation, with no interface hepatitis, with no PMN. Bile ducts are normal.  no lobular inflammation. no steatosis. Colby stage 2 fibrosis. Metavir stage 1 fibrosis. ENDOSCOPIC PROCEDURES:  2021. Colonoscopy by Dr Debora Lopes. Colon polyps. RADIOLOGY:  2020. Ultrasound of liver. Echogenic consistent with chronic liver disease or fatty liver. No liver mass lesions. No dilated bile ducts. No ascites. OTHER TESTIN2021. Cardiac catheeterization. No CAD. FOLLOW-UP:  All of the issues listed above in the Assessment and Plan were discussed with the patient. All questions were answered. The patient expressed a clear understanding of the above. No follow-up at [address] is needed. I would be glad to see the patient back for follow-up at any time in the future if the clinical situation changes.         Tita Tran MD  [address]  4 [facility], [address] [place] [address] [place] Valentina De La Torre, [address]  12 [facility]

## 2022-02-23 NOTE — Clinical Note
3/27/2022    Patient: Jacqulin Olszewski   YOB: 1970   Date of Visit: 2/23/2022     Smith Collazo MD  [address] [address]  Via Fax: [phone]    Dear Smith Collazo MD,      Thank you for referring Ms. Brett Olmos to [address] [place] [address],[address] for evaluation. My notes for this consultation are attached. If you have questions, please do not hesitate to call me. I look forward to following your patient along with you.       Sincerely,    Maury Rowe MD

## 2022-03-27 ENCOUNTER — DOCUMENTATION ONLY (OUTPATIENT)
Dept: HEMATOLOGY | Age: 52
End: 2022-03-27

## 2022-03-27 NOTE — PROGRESS NOTES
[address] [facility], MD, Tristan Brooks, Radha Chavo Willy, [place]      RONNIE Orr, [facility]     Ama Faulkner, [facility]   AIYANA Christiansen, [facility]       Yulissa Cates De Cardenas 136    at [address], [facility] Ye Walls  22. [phone]    FAX: [address] [place] [address] Avenue    [address], [address]    [phone]    FAX: [phone]         To: Renal Transplant Program   [facility]    Date: 3/27/2022    RE: Gilmar Garcia   1970    I have evaluated the patient to ensure there is no chronic liver diease that would impact the patient's ability to undergo renal transplant. A copy of my last office note is attached. The patient has a persistent elevation in ALP. Liver function is normal.      The patient had Fibroscan, liver biopsy and MRI of the liver. 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 The Hamartomas are not connected with the biliary ductal system and do not cause progressive liver injury. The process is benign and will not lead to bile duct cancer in the future. This should not be considered a progressive liver disease. There is no reason why the patient cannot proceed with renal transplant from liver point of view.       Nitza Francis MD  [address] [facility]  4 [facility], [address] [place] [address] Jennifer Knott, [address]  [address] [place] [address]

## 2022-04-26 ENCOUNTER — TELEPHONE (OUTPATIENT)
Dept: HEMATOLOGY | Age: 52
End: 2022-04-26

## 2022-04-26 NOTE — TELEPHONE ENCOUNTER
VALERIAM for pt to return my call regarding her kidney transplant clearance letter being faxed to [employer]. Will inform pt the letter has been faxed and she needs to call and schedule an appt with them.

## (undated) DEVICE — HYPODERMIC SAFETY NEEDLE: Brand: MAGELLAN

## (undated) DEVICE — SKIN MARKER,REGULAR TIP WITH RULER AND LABELS: Brand: DEVON

## (undated) DEVICE — MAYO STAND COVER: Brand: CONVERTORS

## (undated) DEVICE — INTENDED FOR TISSUE SEPARATION, AND OTHER PROCEDURES THAT REQUIRE A SHARP SURGICAL BLADE TO PUNCTURE OR CUT.: Brand: BARD-PARKER ®  SAFETY SCALPED

## (undated) DEVICE — MAJ-1414 SINGLE USE ADPATER BIOPSY VALV: Brand: SINGLE USE ADAPTOR BIOPSY VALVE

## (undated) DEVICE — (D)SYR 10ML 1/5ML GRAD NSAF -- PKGING CHANGE USE ITEM 338027

## (undated) DEVICE — KENDALL RADIOLUCENT FOAM MONITORING ELECTRODE RECTANGULAR SHAPE: Brand: KENDALL

## (undated) DEVICE — MAX-CORE® DISPOSABLE CORE BIOPSY INSTRUMENT, 16G X 16CM: Brand: MAX-CORE

## (undated) DEVICE — SPONGE GZ W4XL4IN COT 12 PLY TYP VII WVN C FLD DSGN

## (undated) DEVICE — TRAY PREP DRY W/ PREM GLV 2 APPL 6 SPNG 2 UNDPD 1 OVERWRAP

## (undated) DEVICE — PAD NON-ADHERENT 3X4 STRL LF --

## (undated) DEVICE — (D)BNDG ADHESIVE FABRIC 3/4X3 -- DISC BY MFR USE ITEM 357960

## (undated) DEVICE — DRAPE,APERTURE,MINOR PROCEDURE: Brand: MEDLINE

## (undated) DEVICE — SOL IRRIGATION INJ NACL 0.9% 500ML BTL